# Patient Record
Sex: FEMALE | Race: WHITE | NOT HISPANIC OR LATINO | URBAN - METROPOLITAN AREA
[De-identification: names, ages, dates, MRNs, and addresses within clinical notes are randomized per-mention and may not be internally consistent; named-entity substitution may affect disease eponyms.]

---

## 2019-01-23 ENCOUNTER — TELEPHONE (OUTPATIENT)
Dept: RHEUMATOLOGY | Facility: CLINIC | Age: 69
End: 2019-01-23

## 2019-01-23 NOTE — TELEPHONE ENCOUNTER
----- Message from Javon Sanchez sent at 1/23/2019  2:22 PM CST -----  Good afternoon,     Marlin Naranjo NP would like to refer the following patient to the rheumatology  department. The patients diagnosis is kidney problems. May you please place the patient on Dr. Cantu's wait list? I have scanned the patients referral and records into [facility].     If there are any further questions in regards to the patient, please contact Dr. Naranjo's office at, [phone]  .   Please let me know if I can help schedule in any way.    Thank you,     Javon Sanchez   [facility]     Contacted nephrology office . Referring for positive MICHAEL.

## 2019-06-24 ENCOUNTER — TELEPHONE (OUTPATIENT)
Dept: RHEUMATOLOGY | Facility: CLINIC | Age: 69
End: 2019-06-24

## 2019-06-24 NOTE — TELEPHONE ENCOUNTER
----- Message from Haritha Desmond sent at 6/24/2019  8:46 AM CDT -----  Contact: Patient  Type:  Sooner Apoointment Request    Caller is requesting a sooner appointment.  Caller declined first available appointment listed below.  Caller will not accept being placed on the waitlist and is requesting a message be sent to doctor.    Name of Caller:  Patient  When is the first available appointment?  7/1/19  Symptoms:  Being referred by a Kidney Doctor/ arthritis  Best Call Back Number:    Additional Information:  Calling to reschedule her appt on 7/1/19, she can't make it. She wants the first avail.Please advise

## 2020-01-20 ENCOUNTER — LAB VISIT (OUTPATIENT)
Dept: LAB | Facility: HOSPITAL | Age: 70
End: 2020-01-20
Attending: INTERNAL MEDICINE
Payer: MEDICARE

## 2020-01-20 ENCOUNTER — INITIAL CONSULT (OUTPATIENT)
Dept: RHEUMATOLOGY | Facility: CLINIC | Age: 70
End: 2020-01-20
Payer: MEDICARE

## 2020-01-20 VITALS
WEIGHT: 182 LBS | BODY MASS INDEX: 34.36 KG/M2 | DIASTOLIC BLOOD PRESSURE: 74 MMHG | SYSTOLIC BLOOD PRESSURE: 153 MMHG | HEIGHT: 61 IN | HEART RATE: 60 BPM

## 2020-01-20 DIAGNOSIS — L40.8 PSORIASIS WITH PUSTULES: ICD-10-CM

## 2020-01-20 DIAGNOSIS — E03.9 HYPOTHYROIDISM, UNSPECIFIED TYPE: ICD-10-CM

## 2020-01-20 DIAGNOSIS — L40.8 PSORIASIS WITH PUSTULES: Primary | ICD-10-CM

## 2020-01-20 DIAGNOSIS — M15.9 OSTEOARTHRITIS OF MULTIPLE JOINTS, UNSPECIFIED OSTEOARTHRITIS TYPE: ICD-10-CM

## 2020-01-20 DIAGNOSIS — L40.50 PSA (PSORIATIC ARTHRITIS): ICD-10-CM

## 2020-01-20 DIAGNOSIS — R79.9 ABNORMAL FINDING OF BLOOD CHEMISTRY, UNSPECIFIED: ICD-10-CM

## 2020-01-20 LAB
ALBUMIN SERPL BCP-MCNC: 4.1 G/DL (ref 3.5–5.2)
ALP SERPL-CCNC: 122 U/L (ref 55–135)
ALT SERPL W/O P-5'-P-CCNC: 16 U/L (ref 10–44)
ANION GAP SERPL CALC-SCNC: 9 MMOL/L (ref 8–16)
AST SERPL-CCNC: 23 U/L (ref 10–40)
BASOPHILS # BLD AUTO: 0.05 K/UL (ref 0–0.2)
BASOPHILS NFR BLD: 0.5 % (ref 0–1.9)
BILIRUB SERPL-MCNC: 0.3 MG/DL (ref 0.1–1)
BUN SERPL-MCNC: 15 MG/DL (ref 8–23)
CALCIUM SERPL-MCNC: 9.4 MG/DL (ref 8.7–10.5)
CCP AB SER IA-ACNC: <0.5 U/ML
CHLORIDE SERPL-SCNC: 107 MMOL/L (ref 95–110)
CO2 SERPL-SCNC: 26 MMOL/L (ref 23–29)
CREAT SERPL-MCNC: 1.1 MG/DL (ref 0.5–1.4)
CRP SERPL-MCNC: 2.6 MG/L (ref 0–8.2)
DIFFERENTIAL METHOD: ABNORMAL
EOSINOPHIL # BLD AUTO: 0.4 K/UL (ref 0–0.5)
EOSINOPHIL NFR BLD: 3.8 % (ref 0–8)
ERYTHROCYTE [DISTWIDTH] IN BLOOD BY AUTOMATED COUNT: 13.2 % (ref 11.5–14.5)
ERYTHROCYTE [SEDIMENTATION RATE] IN BLOOD BY WESTERGREN METHOD: 7 MM/HR (ref 0–20)
EST. GFR  (AFRICAN AMERICAN): 59.2 ML/MIN/1.73 M^2
EST. GFR  (NON AFRICAN AMERICAN): 51.3 ML/MIN/1.73 M^2
FERRITIN SERPL-MCNC: 23 NG/ML (ref 20–300)
GLUCOSE SERPL-MCNC: 82 MG/DL (ref 70–110)
HCT VFR BLD AUTO: 43.3 % (ref 37–48.5)
HGB BLD-MCNC: 13.3 G/DL (ref 12–16)
IMM GRANULOCYTES # BLD AUTO: 0.03 K/UL (ref 0–0.04)
IMM GRANULOCYTES NFR BLD AUTO: 0.3 % (ref 0–0.5)
IRON SERPL-MCNC: 53 UG/DL (ref 30–160)
LYMPHOCYTES # BLD AUTO: 1.9 K/UL (ref 1–4.8)
LYMPHOCYTES NFR BLD: 20.3 % (ref 18–48)
MCH RBC QN AUTO: 26.9 PG (ref 27–31)
MCHC RBC AUTO-ENTMCNC: 30.7 G/DL (ref 32–36)
MCV RBC AUTO: 88 FL (ref 82–98)
MONOCYTES # BLD AUTO: 0.9 K/UL (ref 0.3–1)
MONOCYTES NFR BLD: 9.8 % (ref 4–15)
NEUTROPHILS # BLD AUTO: 6.3 K/UL (ref 1.8–7.7)
NEUTROPHILS NFR BLD: 65.3 % (ref 38–73)
NRBC BLD-RTO: 0 /100 WBC
PLATELET # BLD AUTO: 282 K/UL (ref 150–350)
PMV BLD AUTO: 11.3 FL (ref 9.2–12.9)
POTASSIUM SERPL-SCNC: 4.2 MMOL/L (ref 3.5–5.1)
PROT SERPL-MCNC: 7.6 G/DL (ref 6–8.4)
RBC # BLD AUTO: 4.95 M/UL (ref 4–5.4)
RHEUMATOID FACT SERPL-ACNC: <10 IU/ML (ref 0–15)
SATURATED IRON: 14 % (ref 20–50)
SODIUM SERPL-SCNC: 142 MMOL/L (ref 136–145)
T3FREE SERPL-MCNC: 2.2 PG/ML (ref 2.3–4.2)
T4 FREE SERPL-MCNC: 0.96 NG/DL (ref 0.71–1.51)
THYROGLOB AB SERPL IA-ACNC: <4 IU/ML (ref 0–3.9)
THYROPEROXIDASE IGG SERPL-ACNC: <6 IU/ML
TOTAL IRON BINDING CAPACITY: 376 UG/DL (ref 250–450)
TRANSFERRIN SERPL-MCNC: 252 MG/DL (ref 200–375)
TRANSFERRIN SERPL-MCNC: 254 MG/DL (ref 200–375)
TSH SERPL DL<=0.005 MIU/L-ACNC: 3.35 UIU/ML (ref 0.4–4)
WBC # BLD AUTO: 9.58 K/UL (ref 3.9–12.7)

## 2020-01-20 PROCEDURE — 86235 NUCLEAR ANTIGEN ANTIBODY: CPT | Mod: 59

## 2020-01-20 PROCEDURE — 84165 PROTEIN E-PHORESIS SERUM: CPT

## 2020-01-20 PROCEDURE — 87340 HEPATITIS B SURFACE AG IA: CPT

## 2020-01-20 PROCEDURE — 84165 PATHOLOGIST INTERPRETATION SPE: ICD-10-PCS | Mod: 26,,, | Performed by: PATHOLOGY

## 2020-01-20 PROCEDURE — 99213 OFFICE O/P EST LOW 20 MIN: CPT | Mod: PBBFAC,PO,25 | Performed by: INTERNAL MEDICINE

## 2020-01-20 PROCEDURE — 86800 THYROGLOBULIN ANTIBODY: CPT

## 2020-01-20 PROCEDURE — 86200 CCP ANTIBODY: CPT

## 2020-01-20 PROCEDURE — 83540 ASSAY OF IRON: CPT

## 2020-01-20 PROCEDURE — 86706 HEP B SURFACE ANTIBODY: CPT

## 2020-01-20 PROCEDURE — 85025 COMPLETE CBC W/AUTO DIFF WBC: CPT

## 2020-01-20 PROCEDURE — 86038 ANTINUCLEAR ANTIBODIES: CPT

## 2020-01-20 PROCEDURE — 36415 COLL VENOUS BLD VENIPUNCTURE: CPT | Mod: PO

## 2020-01-20 PROCEDURE — 99205 PR OFFICE/OUTPT VISIT, NEW, LEVL V, 60-74 MIN: ICD-10-PCS | Mod: 25,S$PBB,, | Performed by: NURSE PRACTITIONER

## 2020-01-20 PROCEDURE — 85651 RBC SED RATE NONAUTOMATED: CPT | Mod: PO

## 2020-01-20 PROCEDURE — 84443 ASSAY THYROID STIM HORMONE: CPT

## 2020-01-20 PROCEDURE — 84481 FREE ASSAY (FT-3): CPT

## 2020-01-20 PROCEDURE — 86235 NUCLEAR ANTIGEN ANTIBODY: CPT

## 2020-01-20 PROCEDURE — 84165 PROTEIN E-PHORESIS SERUM: CPT | Mod: 26,,, | Performed by: PATHOLOGY

## 2020-01-20 PROCEDURE — 99205 OFFICE O/P NEW HI 60 MIN: CPT | Mod: 25,S$PBB,, | Performed by: NURSE PRACTITIONER

## 2020-01-20 PROCEDURE — 96372 THER/PROPH/DIAG INJ SC/IM: CPT | Mod: PBBFAC,PO

## 2020-01-20 PROCEDURE — 80053 COMPREHEN METABOLIC PANEL: CPT

## 2020-01-20 PROCEDURE — 86140 C-REACTIVE PROTEIN: CPT

## 2020-01-20 PROCEDURE — 84466 ASSAY OF TRANSFERRIN: CPT | Mod: 91

## 2020-01-20 PROCEDURE — 86225 DNA ANTIBODY NATIVE: CPT

## 2020-01-20 PROCEDURE — 99999 PR PBB SHADOW E&M-EST. PATIENT-LVL III: CPT | Mod: PBBFAC,,, | Performed by: INTERNAL MEDICINE

## 2020-01-20 PROCEDURE — 99999 PR PBB SHADOW E&M-EST. PATIENT-LVL III: ICD-10-PCS | Mod: PBBFAC,,, | Performed by: INTERNAL MEDICINE

## 2020-01-20 PROCEDURE — 86705 HEP B CORE ANTIBODY IGM: CPT

## 2020-01-20 PROCEDURE — 83516 IMMUNOASSAY NONANTIBODY: CPT

## 2020-01-20 PROCEDURE — 82728 ASSAY OF FERRITIN: CPT

## 2020-01-20 PROCEDURE — 86803 HEPATITIS C AB TEST: CPT

## 2020-01-20 PROCEDURE — 84439 ASSAY OF FREE THYROXINE: CPT

## 2020-01-20 PROCEDURE — 86431 RHEUMATOID FACTOR QUANT: CPT

## 2020-01-20 RX ORDER — CYANOCOBALAMIN 1000 UG/ML
1000 INJECTION, SOLUTION INTRAMUSCULAR; SUBCUTANEOUS
Status: COMPLETED | OUTPATIENT
Start: 2020-01-20 | End: 2020-01-20

## 2020-01-20 RX ORDER — ERGOCALCIFEROL 1.25 MG/1
50000 CAPSULE ORAL
COMMUNITY
End: 2020-12-08

## 2020-01-20 RX ORDER — TERBINAFINE HYDROCHLORIDE 250 MG/1
250 TABLET ORAL DAILY
Qty: 15 TABLET | Refills: 0 | Status: SHIPPED | OUTPATIENT
Start: 2020-01-20 | End: 2020-02-04

## 2020-01-20 RX ADMIN — CYANOCOBALAMIN 1000 MCG: 1000 INJECTION, SOLUTION INTRAMUSCULAR at 11:01

## 2020-01-20 ASSESSMENT — ROUTINE ASSESSMENT OF PATIENT INDEX DATA (RAPID3)
PATIENT GLOBAL ASSESSMENT SCORE: 1
MDHAQ FUNCTION SCORE: .2
PSYCHOLOGICAL DISTRESS SCORE: 1.1
PAIN SCORE: 1
TOTAL RAPID3 SCORE: .89

## 2020-01-20 NOTE — PROGRESS NOTES
Administered 1 cc ( 1000 mcg/ml ) of b12 to the left upper outer arm. Informed of s/s to report verbalized understanding. No adverse reactions noted.    Lot # [id number]  Expiration jan 21

## 2020-01-20 NOTE — PROGRESS NOTES
Subjective:       Patient ID: Letha Enciso is a 70 y.o. female.    Chief Complaint: Positive MICHAEL; Pain; and Rash    HPI:  Pt is a 71 yo female with a h/o rash on her skin on the palms of her hands and arch of her feet and  R ankle her joint hands and feet and back is painful. And has anemia unknown cause tx with iron infusions. Patient complains of arthralgias and myalgias for which has been present for a few years. Pain is located in multiple joints, both shoulder(s), both elbow(s), both wrist(s), both MCP(s): 1st, 2nd, 3rd, 4th and 5th, both PIP(s): 1st, 2nd, 3rd, 4th and 5th, both DIP(s): 1st and 2nd, both hip(s), both knee(s) and both MTP(s): 1st, 2nd, 3rd, 4th and 5th, is described as aching, pulsating, shooting and throbbing, and is constant, moderate .  Associated symptoms include: crepitation, decreased range of motion, edema, effusion, tenderness and warmth. Wears  Gloves but is not sure if she is allergic to latex, grand father had rashes. No ra and sle. In the past though she had sle 20 yrs ago, she tried cream no relief.    Review of Systems   Constitutional: Negative for activity change, appetite change, chills, diaphoresis, fatigue, fever and unexpected weight change.   HENT: Negative for congestion, dental problem, ear discharge, ear pain, facial swelling, mouth sores, nosebleeds, postnasal drip, rhinorrhea, sinus pressure, sneezing, sore throat, tinnitus, trouble swallowing and voice change.    Eyes: Negative for photophobia, pain, discharge, redness and itching.   Respiratory: Negative for apnea, cough, chest tightness, shortness of breath and wheezing.    Cardiovascular: Positive for leg swelling. Negative for chest pain and palpitations.   Gastrointestinal: Negative for abdominal distention, abdominal pain, constipation, diarrhea, nausea and vomiting.   Endocrine: Negative for cold intolerance, heat intolerance, polydipsia and polyuria.   Genitourinary: Negative for decreased urine volume,  "difficulty urinating, dysuria, flank pain, frequency, hematuria and urgency.   Musculoskeletal: Positive for arthralgias, back pain, gait problem, joint swelling, myalgias, neck pain and neck stiffness.   Skin: Positive for rash. Negative for pallor and wound.   Allergic/Immunologic: Negative for immunocompromised state.   Neurological: Negative for dizziness, tremors, weakness, numbness and headaches.   Hematological: Negative for adenopathy. Does not bruise/bleed easily.   Psychiatric/Behavioral: Negative for sleep disturbance. The patient is not nervous/anxious.          Objective:   BP (!) 153/74 (BP Location: Left arm, Patient Position: Sitting, BP Method: Medium (Automatic))   Pulse 60   Ht 5' 1" (1.549 m)   Wt 82.6 kg (182 lb)   BMI 34.39 kg/m²      Physical Exam       Right Side Rheumatological Exam     Examination finds the shoulder and elbow normal.    The patient is tender to palpation of the wrist and knee    She has swelling of the wrist and 1st PIP    The patient has an enlarged wrist, knee, 1st PIP, 1st MCP, 2nd PIP, 2nd MCP, 3rd PIP, 3rd MCP, 4th PIP, 4th MCP, 5th PIP and 5th MCP    Foot Exam     Range of Motion   First MTP Joint: limited  Ankle Swelling: positive  Ankle Crepitus: positive    Left Side Rheumatological Exam     Examination finds the shoulder and elbow normal.    She has swelling of the wrist    The patient has an enlarged wrist, knee, 1st PIP, 1st MCP, 2nd PIP, 2nd MCP, 3rd PIP, 3rd MCP, 4th PIP, 4th MCP, 5th PIP and 5th MCP.    Foot Exam     Ankle Warmth: negative  Ankle Swelling: negative      Skin: Rash noted.     Ankle R and palm of the   Musculoskeletal: She exhibits deformity.          Labs 10/19 1/19 anemia low iron elevated kappa levels             Assessment:       1. Psoriasis with pustules    2. Hypothyroidism, unspecified type    3. Osteoarthritis of multiple joints, unspecified osteoarthritis type    4. PSA (psoriatic arthritis)    5. Abnormal finding of blood " chemistry, unspecified             Plan:       Letha was seen today for positive latasha, pain and rash.    Diagnoses and all orders for this visit:    Psoriasis with pustules  Comments:  clinical   Orders:  -     LATASHA Screen w/Reflex; Future  -     Anti Sm/RNP Antibody; Future  -     Anti-DNA antibody, double-stranded; Future  -     Anti-Histone Antibody; Future  -     Anti-scleroderma antibody; Future  -     Anti-Smith antibody; Future  -     CBC auto differential; Future  -     Comprehensive metabolic panel; Future  -     C-reactive protein; Future  -     Sedimentation rate; Future  -     Rheumatoid factor; Future  -     Cyclic citrul peptide antibody, IgG; Future  -     Sjogrens syndrome-A extractable nuclear antibody; Future  -     Sjogrens syndrome-B extractable nuclear antibody; Future  -     TSH; Future  -     Thyroid peroxidase antibody; Future  -     T4, free; Future  -     T3, free; Future  -     Anti-thyroglobulin antibody; Future  -     Hepatitis B core antibody, IgM; Future  -     Hepatitis B surface antibody; Future  -     Hepatitis B surface antigen; Future  -     Hepatitis C antibody; Future  -     Quantiferon Gold TB; Future  -     Iron and TIBC; Future  -     Protein electrophoresis, serum; Future  -     HLA CELIAC CL2; Future  -     HLA B27 Antigen; Future  -     Ambulatory consult to Dermatology  -     Ferritin; Future  -     Transferrin; Future  -     terbinafine HCl (LAMISIL) 250 mg tablet; Take 1 tablet (250 mg total) by mouth once daily. for 15 days  -     cyanocobalamin injection 1,000 mcg    Hypothyroidism, unspecified type  -     LATASHA Screen w/Reflex; Future  -     Anti Sm/RNP Antibody; Future  -     Anti-DNA antibody, double-stranded; Future  -     Anti-Histone Antibody; Future  -     Anti-scleroderma antibody; Future  -     Anti-Smith antibody; Future  -     CBC auto differential; Future  -     Comprehensive metabolic panel; Future  -     C-reactive protein; Future  -     Sedimentation  rate; Future  -     Rheumatoid factor; Future  -     Cyclic citrul peptide antibody, IgG; Future  -     Sjogrens syndrome-A extractable nuclear antibody; Future  -     Sjogrens syndrome-B extractable nuclear antibody; Future  -     TSH; Future  -     Thyroid peroxidase antibody; Future  -     T4, free; Future  -     T3, free; Future  -     Anti-thyroglobulin antibody; Future  -     Hepatitis B core antibody, IgM; Future  -     Hepatitis B surface antibody; Future  -     Hepatitis B surface antigen; Future  -     Hepatitis C antibody; Future  -     Quantiferon Gold TB; Future  -     Iron and TIBC; Future  -     Protein electrophoresis, serum; Future  -     HLA CELIAC CL2; Future  -     HLA B27 Antigen; Future  -     Ambulatory consult to Dermatology  -     Ferritin; Future  -     Transferrin; Future  -     terbinafine HCl (LAMISIL) 250 mg tablet; Take 1 tablet (250 mg total) by mouth once daily. for 15 days  -     cyanocobalamin injection 1,000 mcg    Osteoarthritis of multiple joints, unspecified osteoarthritis type  -     MICHAEL Screen w/Reflex; Future  -     Anti Sm/RNP Antibody; Future  -     Anti-DNA antibody, double-stranded; Future  -     Anti-Histone Antibody; Future  -     Anti-scleroderma antibody; Future  -     Anti-Smith antibody; Future  -     CBC auto differential; Future  -     Comprehensive metabolic panel; Future  -     C-reactive protein; Future  -     Sedimentation rate; Future  -     Rheumatoid factor; Future  -     Cyclic citrul peptide antibody, IgG; Future  -     Sjogrens syndrome-A extractable nuclear antibody; Future  -     Sjogrens syndrome-B extractable nuclear antibody; Future  -     TSH; Future  -     Thyroid peroxidase antibody; Future  -     T4, free; Future  -     T3, free; Future  -     Anti-thyroglobulin antibody; Future  -     Hepatitis B core antibody, IgM; Future  -     Hepatitis B surface antibody; Future  -     Hepatitis B surface antigen; Future  -     Hepatitis C antibody;  Future  -     Quantiferon Gold TB; Future  -     Iron and TIBC; Future  -     Protein electrophoresis, serum; Future  -     HLA CELIAC CL2; Future  -     HLA B27 Antigen; Future  -     Ambulatory consult to Dermatology  -     Ferritin; Future  -     Transferrin; Future  -     terbinafine HCl (LAMISIL) 250 mg tablet; Take 1 tablet (250 mg total) by mouth once daily. for 15 days  -     cyanocobalamin injection 1,000 mcg    PSA (psoriatic arthritis)  -     MICHAEL Screen w/Reflex; Future  -     Anti Sm/RNP Antibody; Future  -     Anti-DNA antibody, double-stranded; Future  -     Anti-Histone Antibody; Future  -     Anti-scleroderma antibody; Future  -     Anti-Smith antibody; Future  -     CBC auto differential; Future  -     Comprehensive metabolic panel; Future  -     C-reactive protein; Future  -     Sedimentation rate; Future  -     Rheumatoid factor; Future  -     Cyclic citrul peptide antibody, IgG; Future  -     Sjogrens syndrome-A extractable nuclear antibody; Future  -     Sjogrens syndrome-B extractable nuclear antibody; Future  -     TSH; Future  -     Thyroid peroxidase antibody; Future  -     T4, free; Future  -     T3, free; Future  -     Anti-thyroglobulin antibody; Future  -     Hepatitis B core antibody, IgM; Future  -     Hepatitis B surface antibody; Future  -     Hepatitis B surface antigen; Future  -     Hepatitis C antibody; Future  -     Quantiferon Gold TB; Future  -     Iron and TIBC; Future  -     Protein electrophoresis, serum; Future  -     HLA CELIAC CL2; Future  -     HLA B27 Antigen; Future  -     Ambulatory consult to Dermatology  -     Ferritin; Future  -     Transferrin; Future  -     terbinafine HCl (LAMISIL) 250 mg tablet; Take 1 tablet (250 mg total) by mouth once daily. for 15 days  -     cyanocobalamin injection 1,000 mcg    Abnormal finding of blood chemistry, unspecified   -     MICHAEL Screen w/Reflex; Future  -     Anti Sm/RNP Antibody; Future  -     Anti-DNA antibody,  double-stranded; Future  -     Anti-Histone Antibody; Future  -     Anti-scleroderma antibody; Future  -     Anti-Smith antibody; Future  -     CBC auto differential; Future  -     Comprehensive metabolic panel; Future  -     C-reactive protein; Future  -     Sedimentation rate; Future  -     Rheumatoid factor; Future  -     Cyclic citrul peptide antibody, IgG; Future  -     Sjogrens syndrome-A extractable nuclear antibody; Future  -     Sjogrens syndrome-B extractable nuclear antibody; Future  -     TSH; Future  -     Thyroid peroxidase antibody; Future  -     T4, free; Future  -     T3, free; Future  -     Anti-thyroglobulin antibody; Future  -     Hepatitis B core antibody, IgM; Future  -     Hepatitis B surface antibody; Future  -     Hepatitis B surface antigen; Future  -     Hepatitis C antibody; Future  -     Quantiferon Gold TB; Future  -     Iron and TIBC; Future  -     Protein electrophoresis, serum; Future  -     HLA CELIAC CL2; Future  -     HLA B27 Antigen; Future  -     Ambulatory consult to Dermatology  -     Ferritin; Future  -     Transferrin; Future  -     terbinafine HCl (LAMISIL) 250 mg tablet; Take 1 tablet (250 mg total) by mouth once daily. for 15 days  -     cyanocobalamin injection 1,000 mcg      More than 50% of the 60 minute encounter was spent face to face counseling the patient regarding current status and future plan of care as well as side of the medications. All questions were answered to patient's satisfaction  She was given info on PSA no new tx started a 14 day lamisil was given

## 2020-01-20 NOTE — LETTER
January 29, 2020      Marlin Naranjo, SANTOS  [address] [facility] [address]  [facility] [place] [address]           [place] - Rheumatology  [address] [place] [address]  Phone: [phone]  Fax: [phone]          Patient: Letha Enciso   MR Number: [id number]   YOB: 1950   Date of Visit: 1/20/2020       Dear Marlin Naranjo:    Thank you for referring Letha Enciso to me for evaluation. Attached you will find relevant portions of my assessment and plan of care.    If you have questions, please do not hesitate to call me. I look forward to following Letha Enciso along with you.    Sincerely,    Ethan Cantu MD    Enclosure  CC:  No Recipients    If you would like to receive this communication electronically, please contact [email or web address] or [phone] to request more information on [facility] Link access.    For providers and/or their staff who would like to refer a patient to [facility], please contact us through our one-stop-shop provider referral line, [facility], at [phone].    If you feel you have received this communication in error or would no longer like to receive these types of communications, please e-mail [email or web address]

## 2020-01-21 LAB
ALBUMIN SERPL ELPH-MCNC: 4.35 G/DL (ref 3.35–5.55)
ALPHA1 GLOB SERPL ELPH-MCNC: 0.26 G/DL (ref 0.17–0.41)
ALPHA2 GLOB SERPL ELPH-MCNC: 0.68 G/DL (ref 0.43–0.99)
ANA SER QL IF: NORMAL
ANTI SM ANTIBODY: 2.87 EU (ref 0–19.99)
ANTI SM/RNP ANTIBODY: 2.78 EU (ref 0–19.99)
ANTI-SM INTERPRETATION: NEGATIVE
ANTI-SM/RNP INTERPRETATION: NEGATIVE
ANTI-SSA ANTIBODY: 0.81 EU (ref 0–19.99)
ANTI-SSA INTERPRETATION: NEGATIVE
ANTI-SSB ANTIBODY: 0.99 EU (ref 0–19.99)
ANTI-SSB INTERPRETATION: NEGATIVE
B-GLOBULIN SERPL ELPH-MCNC: 0.8 G/DL (ref 0.5–1.1)
DSDNA AB SER-ACNC: NORMAL [IU]/ML
GAMMA GLOB SERPL ELPH-MCNC: 1.02 G/DL (ref 0.67–1.58)
HBV CORE IGM SERPL QL IA: NEGATIVE
HBV SURFACE AB SER-ACNC: NEGATIVE M[IU]/ML
HBV SURFACE AG SERPL QL IA: NEGATIVE
HCV AB SERPL QL IA: NEGATIVE
PATHOLOGIST INTERPRETATION SPE: NORMAL
PROT SERPL-MCNC: 7.1 G/DL (ref 6–8.4)

## 2020-01-22 LAB — ENA SCL70 AB SER-ACNC: 7 UNITS

## 2020-01-23 LAB
HISTONE IGG SER IA-ACNC: 2.2 UNITS (ref 0–0.9)
HLA-B27 RELATED AG QL: NEGATIVE
HLA-B27 RELATED AG QL: NORMAL

## 2020-01-23 PROCEDURE — 81374 HLA I TYPING 1 ANTIGEN LR: CPT | Mod: PO

## 2020-01-23 PROCEDURE — 81373 HLA I TYPING 1 LOCUS LR: CPT | Mod: PO

## 2020-01-23 PROCEDURE — 81375 HLA II TYPING AG EQUIV LR: CPT | Mod: PO

## 2020-01-24 LAB
CELIA INTERPRETATION: NORMAL
CELIA TESTING DATE: NORMAL
HLA DQA1 1: 3
HLA DQA1 2: NORMAL
HLA DRB4 1: NORMAL
HLA-DP 1 SERO. EQUIV: NORMAL
HLA-DP 2 SERO. EQUIV: NORMAL
HLA-DPA1 1: NORMAL
HLA-DPA1 2: NORMAL
HLA-DPB1 1: NORMAL
HLA-DPB1 2: NORMAL
HLA-DQ 1 SERO. EQUIV: 8
HLA-DQ 2 SERO. EQUIV: NORMAL
HLA-DQB1 1: NORMAL
HLA-DQB1 2: NORMAL
HLA-DRB1 1 SERO. EQUIV: NORMAL
HLA-DRB1 1: NORMAL
HLA-DRB1 2 SERO. EQUIV: NORMAL
HLA-DRB1 2: NORMAL
HLA-DRB3 1: NORMAL
HLA-DRB3 2: NORMAL
HLA-DRB345 1 SERO. EQUIV: NORMAL
HLA-DRB345 2 SERO. EQUIV: NORMAL
HLA-DRB4 2: NORMAL
HLA-DRB5 1: NORMAL
HLA-DRB5 2: NORMAL

## 2020-03-19 ENCOUNTER — TELEPHONE (OUTPATIENT)
Dept: RHEUMATOLOGY | Facility: CLINIC | Age: 70
End: 2020-03-19

## 2020-03-19 NOTE — TELEPHONE ENCOUNTER
----- Message from Sima  sent at 3/19/2020  9:26 AM CDT -----  Contact: pt  Type: Needs Medical Advice    Who Called:      Best Call Back Number:   Additional Information: Requesting a call back from Nurse, regarding pt wants to know if she is to still come to appt tomorrow she received a call,please call back with advice

## 2020-03-19 NOTE — TELEPHONE ENCOUNTER
----- Message from Kimi Cochran sent at 3/19/2020  3:34 PM CDT -----  Contact: patient  Type:  Patient Returning Call    Who Called:  patient  Who Left Message for Patient:  nancy  Does the patient know what this is regarding?:  Unsure  Best Call Back Number:     Additional Information:  Please advise-thank you

## 2020-03-20 ENCOUNTER — OFFICE VISIT (OUTPATIENT)
Dept: RHEUMATOLOGY | Facility: CLINIC | Age: 70
End: 2020-03-20
Payer: MEDICARE

## 2020-03-20 VITALS
HEART RATE: 59 BPM | HEIGHT: 61 IN | DIASTOLIC BLOOD PRESSURE: 84 MMHG | BODY MASS INDEX: 34.93 KG/M2 | SYSTOLIC BLOOD PRESSURE: 173 MMHG | WEIGHT: 185 LBS

## 2020-03-20 DIAGNOSIS — L13.0 DERMATITIS HERPETIFORMIS: ICD-10-CM

## 2020-03-20 DIAGNOSIS — K90.41 NCGS (NON-CELIAC GLUTEN SENSITIVITY): ICD-10-CM

## 2020-03-20 DIAGNOSIS — L40.8 PSORIASIS WITH PUSTULES: Primary | ICD-10-CM

## 2020-03-20 DIAGNOSIS — R79.9 ABNORMAL FINDING OF BLOOD CHEMISTRY, UNSPECIFIED: ICD-10-CM

## 2020-03-20 DIAGNOSIS — L40.50 PSA (PSORIATIC ARTHRITIS): ICD-10-CM

## 2020-03-20 PROCEDURE — 99999 PR PBB SHADOW E&M-EST. PATIENT-LVL III: CPT | Mod: PBBFAC,,, | Performed by: INTERNAL MEDICINE

## 2020-03-20 PROCEDURE — 99213 OFFICE O/P EST LOW 20 MIN: CPT | Mod: PBBFAC,PO | Performed by: INTERNAL MEDICINE

## 2020-03-20 PROCEDURE — 99214 OFFICE O/P EST MOD 30 MIN: CPT | Mod: S$PBB,,, | Performed by: INTERNAL MEDICINE

## 2020-03-20 PROCEDURE — 99999 PR PBB SHADOW E&M-EST. PATIENT-LVL III: ICD-10-PCS | Mod: PBBFAC,,, | Performed by: INTERNAL MEDICINE

## 2020-03-20 PROCEDURE — 99214 PR OFFICE/OUTPT VISIT, EST, LEVL IV, 30-39 MIN: ICD-10-PCS | Mod: S$PBB,,, | Performed by: INTERNAL MEDICINE

## 2020-03-20 RX ORDER — TRIAMCINOLONE ACETONIDE 0.25 MG/G
OINTMENT TOPICAL 2 TIMES DAILY
Qty: 80 G | Refills: 6 | Status: SHIPPED | OUTPATIENT
Start: 2020-03-20 | End: 2020-04-19

## 2020-03-20 ASSESSMENT — ROUTINE ASSESSMENT OF PATIENT INDEX DATA (RAPID3)
PSYCHOLOGICAL DISTRESS SCORE: 0
PATIENT GLOBAL ASSESSMENT SCORE: 3
MDHAQ FUNCTION SCORE: .2
PAIN SCORE: 0
FATIGUE SCORE: 0
TOTAL RAPID3 SCORE: 1.22

## 2020-03-20 NOTE — PROGRESS NOTES
Subjective:       Patient ID: Letha Enciso is a 70 y.o. female.    Chief Complaint: Disease Management; Psoriasis; Pain; and Swelling    Follow up: 71 yo female dx PSA and  Newly dx Non celiac gluten sensitivity DR8 homozygot,  Pos drug  Induce sle marker. Pain is located in multiple joints, both shoulder(s), both elbow(s), both wrist(s), both MCP(s): 1st, 2nd, 3rd, 4th and 5th, both PIP(s): 1st, 2nd, 3rd, 4th and 5th, both DIP(s): 1st and 2nd, both hip(s), both knee(s) and both MTP(s): 1st, 2nd, 3rd, 4th and 5th, is described as aching, pulsating, shooting and throbbing, and is constant, moderate .  Associated symptoms include: crepitation, decreased range of motion, edema, effusion, tenderness and warmth.    Review of Systems   Constitutional: Negative for activity change, appetite change and unexpected weight change.   HENT: Negative for dental problem, ear discharge, ear pain, facial swelling, mouth sores, nosebleeds, postnasal drip, rhinorrhea, sinus pressure, sneezing, tinnitus and voice change.    Eyes: Negative for photophobia, pain, discharge, redness and itching.   Respiratory: Negative for apnea, chest tightness, shortness of breath and wheezing.    Cardiovascular: Positive for leg swelling. Negative for palpitations.   Gastrointestinal: Negative for abdominal distention, constipation and diarrhea.   Endocrine: Negative for cold intolerance, heat intolerance, polydipsia and polyuria.   Genitourinary: Negative for decreased urine volume, difficulty urinating, flank pain, frequency, hematuria and urgency.   Musculoskeletal: Positive for arthralgias, back pain, gait problem, joint swelling, myalgias, neck pain and neck stiffness.   Skin: Negative for pallor and wound.   Allergic/Immunologic: Negative for immunocompromised state.   Neurological: Negative for dizziness and tremors.   Hematological: Negative for adenopathy. Does not bruise/bleed easily.   Psychiatric/Behavioral: Negative for sleep  "disturbance. The patient is not nervous/anxious.          Objective:   BP (!) 173/84 (BP Location: Left arm, Patient Position: Sitting, BP Method: Medium (Automatic))   Pulse (!) 59   Ht 5' 1" (1.549 m)   Wt 83.9 kg (185 lb)   BMI 34.96 kg/m²      Physical Exam       Right Side Rheumatological Exam     Examination finds the shoulder and elbow normal.    The patient is tender to palpation of the wrist and knee    She has swelling of the wrist and 1st PIP    The patient has an enlarged wrist, knee, 1st PIP, 1st MCP, 2nd PIP, 2nd MCP, 3rd PIP, 3rd MCP, 4th PIP, 4th MCP, 5th PIP and 5th MCP    Foot Exam     Range of Motion   First MTP Joint: limited  Ankle Swelling: positive  Ankle Crepitus: positive    Left Side Rheumatological Exam     Examination finds the shoulder and elbow normal.    She has swelling of the wrist    The patient has an enlarged wrist, knee, 1st PIP, 1st MCP, 2nd PIP, 2nd MCP, 3rd PIP, 3rd MCP, 4th PIP, 4th MCP, 5th PIP and 5th MCP.    Foot Exam     Ankle Warmth: negative  Ankle Swelling: negative      Skin: Rash noted.     Ankle R and palm of the   Musculoskeletal: She exhibits tenderness and deformity.          Labs 10/19 1/19 anemia low iron elevated kappa levels             Assessment:       1. Psoriasis with pustules    2. PSA (psoriatic arthritis)    3. Abnormal finding of blood chemistry, unspecified     4. NCGS (non-celiac gluten sensitivity)    5. Dermatitis herpetiformis            Plan:       Letha was seen today for disease management, psoriasis, pain and swelling.    Diagnoses and all orders for this visit:    Psoriasis with pustules  -     ustekinumab (STELARA) 90 mg/mL Syrg syringe; Inject 1 mL (90 mg total) into the skin every 30 days.  -     ustekinumab (STELARA) 90 mg/mL Syrg syringe; Inject 1 mL (90 mg total) into the skin every 3 (three) months.    PSA (psoriatic arthritis)    Abnormal finding of blood chemistry, unspecified     NCGS (non-celiac gluten " sensitivity)    Dermatitis herpetiformis  -     triamcinolone acetonide 0.025% (KENALOG) 0.025 % Oint; Apply topically 2 (two) times daily.       she will try gluten free diet and we will get stelara approved and after  micheal b 19 clears start stelara.over 50% of this visit was explain labs and possible disease states and course of treatments    More than 50% of the 60 minute encounter was spent face to face counseling the patient regarding current status and future plan of care as well as side of the medications. All questions were answered to patient's satisfaction

## 2020-03-24 ENCOUNTER — TELEPHONE (OUTPATIENT)
Dept: PHARMACY | Facility: CLINIC | Age: 70
End: 2020-03-24

## 2020-03-24 NOTE — TELEPHONE ENCOUNTER
LVM for callback to inform patient that [facility] received prescription for Stelara and prior authorization is required.  [facility] will be back in touch once insurance determination is received.

## 2020-04-20 NOTE — TELEPHONE ENCOUNTER
DOCUMENTATION ONLY:   Stelara prior authorization approved until 4/18/23  Estimated Co-Pay: $2636.16  Forwarded to Financial Assistance for co-pay card

## 2020-04-20 NOTE — TELEPHONE ENCOUNTER
Patient reached regarding Stelara prescription. Offered to apply for financial assistance through [employer] but patient is over the income limit for the program. She reports she was told to try a GF diet and has done so for 4 weeks and has shown improvement. Advised pharmacist will reach out to provider to see how she would like patient to proceed.

## 2020-04-22 ENCOUNTER — TELEPHONE (OUTPATIENT)
Dept: RHEUMATOLOGY | Facility: CLINIC | Age: 70
End: 2020-04-22

## 2020-04-22 NOTE — TELEPHONE ENCOUNTER
----- Message from Hayley John, PharmD sent at 4/21/2020  9:07 AM CDT -----  Regarding: Jayesh Cantu and staff,    [facility] has been working on Ms. Enciso's Hugolara. She was approved with very high copay but declined application for  assistance at this time. She says she was advised to try a gluten free diet and has done so over the last month and has seen improvement. I just wanted to make sure you were aware. Please advise if [facility] can be of further assistance.     Thank you,  Hayley John, PharmD  [facility]  [phone]

## 2020-05-25 ENCOUNTER — TELEPHONE (OUTPATIENT)
Dept: RHEUMATOLOGY | Facility: CLINIC | Age: 70
End: 2020-05-25

## 2020-05-25 NOTE — TELEPHONE ENCOUNTER
----- Message from Erin Joyner PharmD sent at 5/12/2020  2:34 PM CDT -----  Regarding: PsO   Good afternoon Dr. Cantu and staff,    Ms. Enciso reports the her gluten free diet has been helping, but she reports that her PsO is still itching significantly, causing sores and keeping her awake at night. Patient does not qualify for financial assistance for Stelara, and medication is too costly for patient.     Her next appt is 6/8. Patient is open to other treatment options. Cosentyx, Otezla, Tremfya, or Taltz are other options to manage the patient's PsO, if appropriate. Please advise, and please let me know if I can assist further. Thank you    Best,    Erin Joyner, PharmD  Clinical Pharmacist  [facility]  [phone]

## 2020-05-26 NOTE — TELEPHONE ENCOUNTER
We can discuss alternative tx at her f/u visit. If she needs to be seen sooner then move her up with me

## 2020-05-27 NOTE — TELEPHONE ENCOUNTER
Incoming call from pt, she reports she does need to be seen in office. Discussed with Whitley and scheduled pt for 6/3 @ 9am to be seen in office. No further questions.

## 2020-06-03 ENCOUNTER — LAB VISIT (OUTPATIENT)
Dept: LAB | Facility: HOSPITAL | Age: 70
End: 2020-06-03
Attending: PHYSICIAN ASSISTANT
Payer: MEDICARE

## 2020-06-03 ENCOUNTER — TELEPHONE (OUTPATIENT)
Dept: PHARMACY | Facility: CLINIC | Age: 70
End: 2020-06-03

## 2020-06-03 ENCOUNTER — OFFICE VISIT (OUTPATIENT)
Dept: RHEUMATOLOGY | Facility: CLINIC | Age: 70
End: 2020-06-03
Payer: MEDICARE

## 2020-06-03 VITALS
DIASTOLIC BLOOD PRESSURE: 83 MMHG | SYSTOLIC BLOOD PRESSURE: 160 MMHG | WEIGHT: 169 LBS | BODY MASS INDEX: 31.91 KG/M2 | HEIGHT: 61 IN

## 2020-06-03 DIAGNOSIS — N28.9 RENAL INSUFFICIENCY: ICD-10-CM

## 2020-06-03 DIAGNOSIS — L40.50 PSA (PSORIATIC ARTHRITIS): Primary | ICD-10-CM

## 2020-06-03 DIAGNOSIS — L40.8 PSORIASIS WITH PUSTULES: ICD-10-CM

## 2020-06-03 DIAGNOSIS — L40.50 PSA (PSORIATIC ARTHRITIS): ICD-10-CM

## 2020-06-03 DIAGNOSIS — K90.41 NCGS (NON-CELIAC GLUTEN SENSITIVITY): ICD-10-CM

## 2020-06-03 LAB
ALBUMIN SERPL BCP-MCNC: 4.2 G/DL (ref 3.5–5.2)
ALP SERPL-CCNC: 107 U/L (ref 55–135)
ALT SERPL W/O P-5'-P-CCNC: 17 U/L (ref 10–44)
ANION GAP SERPL CALC-SCNC: 7 MMOL/L (ref 8–16)
AST SERPL-CCNC: 26 U/L (ref 10–40)
BASOPHILS # BLD AUTO: 0.07 K/UL (ref 0–0.2)
BASOPHILS NFR BLD: 0.9 % (ref 0–1.9)
BILIRUB SERPL-MCNC: 0.4 MG/DL (ref 0.1–1)
BUN SERPL-MCNC: 16 MG/DL (ref 8–23)
CALCIUM SERPL-MCNC: 10 MG/DL (ref 8.7–10.5)
CHLORIDE SERPL-SCNC: 105 MMOL/L (ref 95–110)
CO2 SERPL-SCNC: 28 MMOL/L (ref 23–29)
CREAT SERPL-MCNC: 1.1 MG/DL (ref 0.5–1.4)
CRP SERPL-MCNC: 1.2 MG/L (ref 0–8.2)
DIFFERENTIAL METHOD: ABNORMAL
EOSINOPHIL # BLD AUTO: 0.2 K/UL (ref 0–0.5)
EOSINOPHIL NFR BLD: 2.8 % (ref 0–8)
ERYTHROCYTE [DISTWIDTH] IN BLOOD BY AUTOMATED COUNT: 13.6 % (ref 11.5–14.5)
ERYTHROCYTE [SEDIMENTATION RATE] IN BLOOD BY WESTERGREN METHOD: 11 MM/HR (ref 0–20)
EST. GFR  (AFRICAN AMERICAN): 59.2 ML/MIN/1.73 M^2
EST. GFR  (NON AFRICAN AMERICAN): 51.3 ML/MIN/1.73 M^2
GLUCOSE SERPL-MCNC: 97 MG/DL (ref 70–110)
HCT VFR BLD AUTO: 45.2 % (ref 37–48.5)
HGB BLD-MCNC: 13.8 G/DL (ref 12–16)
IMM GRANULOCYTES # BLD AUTO: 0.03 K/UL (ref 0–0.04)
IMM GRANULOCYTES NFR BLD AUTO: 0.4 % (ref 0–0.5)
LYMPHOCYTES # BLD AUTO: 1.9 K/UL (ref 1–4.8)
LYMPHOCYTES NFR BLD: 23.9 % (ref 18–48)
MCH RBC QN AUTO: 26.8 PG (ref 27–31)
MCHC RBC AUTO-ENTMCNC: 30.5 G/DL (ref 32–36)
MCV RBC AUTO: 88 FL (ref 82–98)
MONOCYTES # BLD AUTO: 0.8 K/UL (ref 0.3–1)
MONOCYTES NFR BLD: 10.3 % (ref 4–15)
NEUTROPHILS # BLD AUTO: 5 K/UL (ref 1.8–7.7)
NEUTROPHILS NFR BLD: 61.7 % (ref 38–73)
NRBC BLD-RTO: 0 /100 WBC
PLATELET # BLD AUTO: 298 K/UL (ref 150–350)
PMV BLD AUTO: 10.9 FL (ref 9.2–12.9)
POTASSIUM SERPL-SCNC: 4.3 MMOL/L (ref 3.5–5.1)
PROT SERPL-MCNC: 7.6 G/DL (ref 6–8.4)
RBC # BLD AUTO: 5.15 M/UL (ref 4–5.4)
SODIUM SERPL-SCNC: 140 MMOL/L (ref 136–145)
WBC # BLD AUTO: 8.12 K/UL (ref 3.9–12.7)

## 2020-06-03 PROCEDURE — 99213 OFFICE O/P EST LOW 20 MIN: CPT | Mod: PBBFAC,PO | Performed by: PHYSICIAN ASSISTANT

## 2020-06-03 PROCEDURE — 36415 COLL VENOUS BLD VENIPUNCTURE: CPT | Mod: PO

## 2020-06-03 PROCEDURE — 99999 PR PBB SHADOW E&M-EST. PATIENT-LVL III: CPT | Mod: PBBFAC,,, | Performed by: PHYSICIAN ASSISTANT

## 2020-06-03 PROCEDURE — 80053 COMPREHEN METABOLIC PANEL: CPT

## 2020-06-03 PROCEDURE — 85651 RBC SED RATE NONAUTOMATED: CPT | Mod: PO

## 2020-06-03 PROCEDURE — 85025 COMPLETE CBC W/AUTO DIFF WBC: CPT

## 2020-06-03 PROCEDURE — 99214 PR OFFICE/OUTPT VISIT, EST, LEVL IV, 30-39 MIN: ICD-10-PCS | Mod: S$PBB,,, | Performed by: PHYSICIAN ASSISTANT

## 2020-06-03 PROCEDURE — 86140 C-REACTIVE PROTEIN: CPT

## 2020-06-03 PROCEDURE — 99214 OFFICE O/P EST MOD 30 MIN: CPT | Mod: S$PBB,,, | Performed by: PHYSICIAN ASSISTANT

## 2020-06-03 PROCEDURE — 99999 PR PBB SHADOW E&M-EST. PATIENT-LVL III: ICD-10-PCS | Mod: PBBFAC,,, | Performed by: PHYSICIAN ASSISTANT

## 2020-06-03 NOTE — PROGRESS NOTES
Subjective:       Patient ID: Letha Enciso is a 70 y.o. female.    Chief Complaint: Disease Management    Mrs. Enciso is a 70 year old female who presents to clinic for follow up on psoriatic arthritis and psoriasis. She is a new patient to me. At her last visit, Stelara was discussed; however, due to high copay it was never started. Pt also started gluten free diet 3 months ago and has lost 16 lbs and noticed a significant improvement in her generalized pruritis and rash. Kenalog cream is helpful as well. She continues to have psoriasis on the palms and R lateral ankle. She denies significant small joint pain or stiffness, but does have occasional pain the R 2nd DIP. She complains of intermittent neck pain and R knee pain. R knee was evaluated by Ortho in April of last year with IACS and she was started on mobic 7.5 mg which is working quite well.     Current tx:  1. mobic 7.5 mg    Review of Systems   Constitutional: Positive for activity change. Negative for appetite change, chills, fatigue and fever.   Eyes: Negative for visual disturbance.   Respiratory: Negative for cough and shortness of breath.    Cardiovascular: Negative for chest pain, palpitations and leg swelling.   Gastrointestinal: Negative for abdominal pain, constipation, diarrhea, nausea and vomiting.   Musculoskeletal: Positive for arthralgias.   Skin: Positive for rash.   Neurological: Negative for dizziness, weakness, light-headedness and headaches.         Objective:     Vitals:    06/03/20 0914   BP: (!) 160/83       Past Medical History:   Diagnosis Date    Anemia      History reviewed. No pertinent surgical history.       Physical Exam   Constitutional: She is well-developed, well-nourished, and in no distress.   Eyes: Right conjunctiva is not injected. Left conjunctiva is not injected. Right eye exhibits normal extraocular motion. Left eye exhibits normal extraocular motion.   Neck: No JVD present. No thyromegaly present.    Cardiovascular: Normal rate.  Exam reveals no decreased pulses.    Pulmonary/Chest: Effort normal.       Right Side Rheumatological Exam     Examination finds the shoulder, elbow, wrist, knee, 1st PIP, 1st MCP, 2nd PIP, 2nd MCP, 3rd PIP, 3rd MCP, 4th PIP, 4th MCP, 5th PIP and 5th MCP normal.    The patient is tender to palpation of the 2nd DIP    The patient has an enlarged 2nd DIP, 3rd DIP, 4th DIP and 5th DIP    Left Side Rheumatological Exam     Examination finds the shoulder, elbow, wrist, knee, 1st PIP, 1st MCP, 2nd PIP, 2nd MCP, 3rd PIP, 3rd MCP, 4th PIP, 4th MCP, 5th PIP and 5th MCP normal.    The patient has an enlarged 2nd DIP, 3rd DIP, 4th DIP and 5th DIP.      Lymphadenopathy:     She has no cervical adenopathy.   Neurological: Gait normal.   Skin: Rash (psorasis on the R palm and R lateral ankle--much improved compared to 3/2020 visit) noted.     Psychiatric: Mood and affect normal.       Recent labs reviewed:  Component      Latest Ref Rng & Units 1/20/2020   WBC      3.90 - 12.70 K/uL 9.58   RBC      4.00 - 5.40 M/uL 4.95   Hemoglobin      12.0 - 16.0 g/dL 13.3   Hematocrit      37.0 - 48.5 % 43.3   MCV      82 - 98 fL 88   MCH      27.0 - 31.0 pg 26.9 (L)   MCHC      32.0 - 36.0 g/dL 30.7 (L)   RDW      11.5 - 14.5 % 13.2   Platelets      150 - 350 K/uL 282   MPV      9.2 - 12.9 fL 11.3   Immature Granulocytes      0.0 - 0.5 % 0.3   Gran # (ANC)      1.8 - 7.7 K/uL 6.3   Immature Grans (Abs)      0.00 - 0.04 K/uL 0.03   Lymph #      1.0 - 4.8 K/uL 1.9   Mono #      0.3 - 1.0 K/uL 0.9   Eos #      0.0 - 0.5 K/uL 0.4   Baso #      0.00 - 0.20 K/uL 0.05   nRBC      0 /100 WBC 0   Gran%      38.0 - 73.0 % 65.3   Lymph%      18.0 - 48.0 % 20.3   Mono%      4.0 - 15.0 % 9.8   Eosinophil%      0.0 - 8.0 % 3.8   Basophil%      0.0 - 1.9 % 0.5   Differential Method       Automated   Sodium      136 - 145 mmol/L 142   Potassium      3.5 - 5.1 mmol/L 4.2   Chloride      95 - 110 mmol/L 107   CO2      23 - 29  mmol/L 26   Glucose      70 - 110 mg/dL 82   BUN, Bld      8 - 23 mg/dL 15   Creatinine      0.5 - 1.4 mg/dL 1.1   Calcium      8.7 - 10.5 mg/dL 9.4   PROTEIN TOTAL      6.0 - 8.4 g/dL 7.6   Albumin      3.5 - 5.2 g/dL 4.1   BILIRUBIN TOTAL      0.1 - 1.0 mg/dL 0.3   Alkaline Phosphatase      55 - 135 U/L 122   AST      10 - 40 U/L 23   ALT      10 - 44 U/L 16   Anion Gap      8 - 16 mmol/L 9   eGFR if African American      >60 mL/min/1.73 m:2 59.2 (A)   eGFR if non African American      >60 mL/min/1.73 m:2 51.3 (A)   Iron      30 - 160 ug/dL 53   Transferrin      200 - 375 mg/dL 254   TIBC      250 - 450 ug/dL 376   Saturated Iron      20 - 50 % 14 (L)   Anti-Histone Antibody      0.0 - 0.9 Units 2.2 (H)     Assessment:       1. PSA (psoriatic arthritis)    2. NCGS (non-celiac gluten sensitivity)    3. Psoriasis with pustules    4. Renal insufficiency            Plan:       PSA (psoriatic arthritis)  -     apremilast (OTEZLA STARTER) 10 mg (4)-20 mg (4)-30 mg (47) DsPk; Follow instructions on package  Dispense: 1 tablet; Refill: 0  -     apremilast (OTEZLA) 30 mg Tab; Take 1 tablet (30 mg total) by mouth 2 (two) times daily.  Dispense: 60 tablet; Refill: 5  -     CBC auto differential; Future; Expected date: 06/03/2020  -     Comprehensive metabolic panel; Future; Expected date: 06/03/2020  -     C-Reactive Protein; Future; Expected date: 06/03/2020  -     Sedimentation rate; Future; Expected date: 06/03/2020    NCGS (non-celiac gluten sensitivity)    Psoriasis with pustules    Renal insufficiency        Assessment:  70 year old female with  Psoriatic arthritis, psoriasis, hx of MICHAEL positive, histone antibody positive  --hx of XANDER s/p infusion  --NCGS  --renal insufficiency    Plan:  1. Check labs today. Monitor renal function closely on mobic.  2. Start Otezla 30 mg bid sent to [facility]. Information provided to patient and discussed r/b/a. Jayesh was too expensive. May consider SSZ if not able to afford.  3. Cont.  mobic 7.5 mg daily  4. Monitor BP at home and keep a log. Notify clinic if BP is persistently >140/90.      Follow up:  3 months Dr. Cantu

## 2020-06-12 ENCOUNTER — TELEPHONE (OUTPATIENT)
Dept: RHEUMATOLOGY | Facility: CLINIC | Age: 70
End: 2020-06-12

## 2020-06-12 NOTE — TELEPHONE ENCOUNTER
----- Message from Whitley Kaur PA-C sent at 6/4/2020 12:43 PM CDT -----  Renal function is impaired, but stable compared to 4 mo ago. Ok to continue mobic for now and we will monitor this over time. Labs are otherwise essentially normal.

## 2020-06-12 NOTE — TELEPHONE ENCOUNTER
Contacted patient regarding lab results. Informed Renal function is impaired, but stable compared to 4 mo ago. Ok to continue mobic for now and we will monitor this over time. Labs are otherwise essentially normal. Patient verbalized understanding.

## 2020-07-14 ENCOUNTER — TELEPHONE (OUTPATIENT)
Dept: RHEUMATOLOGY | Facility: CLINIC | Age: 70
End: 2020-07-14

## 2020-07-14 NOTE — TELEPHONE ENCOUNTER
----- Message from Crystal Correia PharmD sent at 7/8/2020  8:43 AM CDT -----  Regarding: PsO and/or PsA treatment  Good morning Dr. Cantu and staff,     I just wanted to inform you that pt has not received PsO/PsA treatment from [facility] due to unaffordable copays. It looks like the most recent prescription we received PA for was Otezla but it still had a very high copay, $1,547 for 1 month supply. We spoke with pt on 6/24/20 and pt is over the income limit to qualify for financial assistance and medication is unaffordable for her. Please let us know if we can assist any further.       Crystal Correia, Pharm.D., CSP  Clinical Pharmacist- Inflammatory/GI  [facility]  [phone]

## 2020-08-24 DIAGNOSIS — L40.50 PSA (PSORIATIC ARTHRITIS): Primary | ICD-10-CM

## 2020-08-24 RX ORDER — SULFASALAZINE 500 MG/1
500 TABLET, DELAYED RELEASE ORAL 2 TIMES DAILY
Qty: 60 TABLET | Refills: 3 | Status: SHIPPED | OUTPATIENT
Start: 2020-08-24 | End: 2020-09-14

## 2020-08-24 NOTE — TELEPHONE ENCOUNTER
----- Message from Crystal Correia PharmD sent at 7/8/2020  8:43 AM CDT -----  Regarding: PsO and/or PsA treatment  Good morning Dr. Cantu and staff,     I just wanted to inform you that pt has not received PsO/PsA treatment from [facility] due to unaffordable copays. It looks like the most recent prescription we received PA for was Otezla but it still had a very high copay, $1,547 for 1 month supply. We spoke with pt on 6/24/20 and pt is over the income limit to qualify for financial assistance and medication is unaffordable for her. Please let us know if we can assist any further.       Crystal Correia, Pharm.D., CSP  Clinical Pharmacist- Inflammatory/GI  [facility]  [phone]

## 2020-08-24 NOTE — TELEPHONE ENCOUNTER
Contacted patient regarding sulfasalazine. Patient stated not having any joint pain. Stated only complaints is a rash. Patient is willing to go ahead and start sulfasalazine. Nurse informed will notify provider so script can be sent. Patient verbalized understanding.

## 2020-08-24 NOTE — TELEPHONE ENCOUNTER
----- Message from Crystal Correia PharmD sent at 7/8/2020  8:43 AM CDT -----  Regarding: PsO and/or PsA treatment  Good morning Dr. Cantu and staff,     I just wanted to inform you that pt has not received PsO/PsA treatment from [facility] due to unaffordable copays. It looks like the most recent prescription we received PA for was Otezla but it still had a very high copay, $1,547 for 1 month supply. We spoke with pt on 6/24/20 and pt is over the income limit to qualify for financial assistance and medication is unaffordable for her. Please let us know if we can assist any further.       Crystal Correia, Pharm.D., CSP  Clinical Pharmacist- Inflammatory/GI  [facility]  [phone]

## 2020-08-24 NOTE — TELEPHONE ENCOUNTER
Unfortunately, unable to afford stelara or otezla.     We can try  mg twice daily for her arthritis. Most common side effects are GI (nausea, vomiting etc). Let me know if she wants to start or wait until f/u with Dr. Cantu to discuss other treatment options.

## 2020-09-14 ENCOUNTER — OFFICE VISIT (OUTPATIENT)
Dept: RHEUMATOLOGY | Facility: CLINIC | Age: 70
End: 2020-09-14
Payer: MEDICARE

## 2020-09-14 VITALS
BODY MASS INDEX: 31.24 KG/M2 | TEMPERATURE: 97 F | WEIGHT: 165.38 LBS | HEART RATE: 48 BPM | DIASTOLIC BLOOD PRESSURE: 85 MMHG | SYSTOLIC BLOOD PRESSURE: 161 MMHG

## 2020-09-14 DIAGNOSIS — R79.9 ABNORMAL FINDING OF BLOOD CHEMISTRY, UNSPECIFIED: ICD-10-CM

## 2020-09-14 DIAGNOSIS — L40.50 PSA (PSORIATIC ARTHRITIS): Primary | ICD-10-CM

## 2020-09-14 DIAGNOSIS — K90.41 NCGS (NON-CELIAC GLUTEN SENSITIVITY): ICD-10-CM

## 2020-09-14 DIAGNOSIS — L40.8 PSORIASIS WITH PUSTULES: ICD-10-CM

## 2020-09-14 DIAGNOSIS — L13.0 DERMATITIS HERPETIFORMIS: ICD-10-CM

## 2020-09-14 PROCEDURE — 99215 OFFICE O/P EST HI 40 MIN: CPT | Mod: S$PBB,,, | Performed by: INTERNAL MEDICINE

## 2020-09-14 PROCEDURE — 99999 PR PBB SHADOW E&M-EST. PATIENT-LVL III: ICD-10-PCS | Mod: PBBFAC,,, | Performed by: INTERNAL MEDICINE

## 2020-09-14 PROCEDURE — 99213 OFFICE O/P EST LOW 20 MIN: CPT | Mod: PBBFAC,PO,25 | Performed by: INTERNAL MEDICINE

## 2020-09-14 PROCEDURE — 96372 THER/PROPH/DIAG INJ SC/IM: CPT | Mod: PBBFAC,PO

## 2020-09-14 PROCEDURE — 99999 PR PBB SHADOW E&M-EST. PATIENT-LVL III: CPT | Mod: PBBFAC,,, | Performed by: INTERNAL MEDICINE

## 2020-09-14 PROCEDURE — 99215 PR OFFICE/OUTPT VISIT, EST, LEVL V, 40-54 MIN: ICD-10-PCS | Mod: S$PBB,,, | Performed by: INTERNAL MEDICINE

## 2020-09-14 RX ORDER — CYANOCOBALAMIN 1000 UG/ML
1000 INJECTION, SOLUTION INTRAMUSCULAR; SUBCUTANEOUS
Status: COMPLETED | OUTPATIENT
Start: 2020-09-14 | End: 2020-09-14

## 2020-09-14 RX ORDER — ADALIMUMAB 40MG/0.4ML
40 KIT SUBCUTANEOUS
Qty: 2 PEN | Refills: 12 | Status: SHIPPED | OUTPATIENT
Start: 2020-09-14 | End: 2020-12-08

## 2020-09-14 RX ADMIN — CYANOCOBALAMIN 1000 MCG: 1000 INJECTION, SOLUTION INTRAMUSCULAR at 11:09

## 2020-09-14 NOTE — PROGRESS NOTES
Subjective:       Patient ID: Letha Enciso is a 70 y.o. female.    Chief Complaint: Disease Management and Pain    Follow up: 70 year old female psoriatic arthritis and psoriasis.NcGs she was unable to start  Stelara  Pt also started gluten free diet 3 months ago and has lost 16 lbs and noticed a significant improvement in her generalized pruritis and rash. Kenalog cream is helpful as well. She continues to have psoriasis on the palms and R lateral ankle. She denies significant small joint pain or stiffness, but does have occasional pain the R 2nd DIP. She complains of intermittent neck pain and R knee pain. R knee was evaluated by Ortho in April of last year with IACS and she was started on mobic 7.5 mg which is working quite well. She increased has fatigue and sleeping alot  Current tx:  1. mobic 7.5 mg    Review of Systems   Constitutional: Positive for activity change. Negative for appetite change and chills.   Eyes: Negative for visual disturbance.   Respiratory: Negative for cough and shortness of breath.    Cardiovascular: Negative for chest pain, palpitations and leg swelling.   Gastrointestinal: Negative for abdominal pain, constipation, diarrhea, nausea and vomiting.   Musculoskeletal: Positive for arthralgias, back pain and gait problem.   Skin: Positive for rash.   Neurological: Negative for dizziness, weakness and light-headedness.         Objective:     Vitals:    09/14/20 1018   BP: (!) 161/85   Pulse: (!) 48   Temp: 97.2 °F (36.2 °C)       Past Medical History:   Diagnosis Date    Anemia      No past surgical history on file.       Physical Exam   Constitutional: She is well-developed, well-nourished, and in no distress.   Eyes: Right conjunctiva is not injected. Left conjunctiva is not injected. Right eye exhibits normal extraocular motion. Left eye exhibits normal extraocular motion.   Neck: No JVD present. No thyromegaly present.   Cardiovascular: Normal rate.  Exam reveals no decreased  pulses.    Pulmonary/Chest: Effort normal.       Right Side Rheumatological Exam     Examination finds the shoulder, elbow, wrist, knee, 1st PIP, 1st MCP, 2nd PIP, 2nd MCP, 3rd PIP, 3rd MCP, 4th PIP, 4th MCP, 5th PIP and 5th MCP normal.    The patient is tender to palpation of the 2nd DIP    The patient has an enlarged 2nd DIP, 3rd DIP, 4th DIP and 5th DIP    Left Side Rheumatological Exam     Examination finds the shoulder, elbow, wrist, knee, 1st PIP, 1st MCP, 2nd PIP, 2nd MCP, 3rd PIP, 3rd MCP, 4th PIP, 4th MCP, 5th PIP and 5th MCP normal.    The patient has an enlarged 2nd DIP, 3rd DIP, 4th DIP and 5th DIP.      Lymphadenopathy:     She has no cervical adenopathy.   Neurological: Gait normal.   Skin: Rash (psorasis on the R palm and R lateral ankle--much improved compared to 3/2020 visit) noted.     Psychiatric: Mood and affect normal.   Musculoskeletal: Tenderness and deformity present.           Results for orders placed or performed in visit on 06/03/20   CBC auto differential   Result Value Ref Range    WBC 8.12 3.90 - 12.70 K/uL    RBC 5.15 4.00 - 5.40 M/uL    Hemoglobin 13.8 12.0 - 16.0 g/dL    Hematocrit 45.2 37.0 - 48.5 %    Mean Corpuscular Volume 88 82 - 98 fL    Mean Corpuscular Hemoglobin 26.8 (L) 27.0 - 31.0 pg    Mean Corpuscular Hemoglobin Conc 30.5 (L) 32.0 - 36.0 g/dL    RDW 13.6 11.5 - 14.5 %    Platelets 298 150 - 350 K/uL    MPV 10.9 9.2 - 12.9 fL    Immature Granulocytes 0.4 0.0 - 0.5 %    Gran # (ANC) 5.0 1.8 - 7.7 K/uL    Immature Grans (Abs) 0.03 0.00 - 0.04 K/uL    Lymph # 1.9 1.0 - 4.8 K/uL    Mono # 0.8 0.3 - 1.0 K/uL    Eos # 0.2 0.0 - 0.5 K/uL    Baso # 0.07 0.00 - 0.20 K/uL    nRBC 0 0 /100 WBC    Gran% 61.7 38.0 - 73.0 %    Lymph% 23.9 18.0 - 48.0 %    Mono% 10.3 4.0 - 15.0 %    Eosinophil% 2.8 0.0 - 8.0 %    Basophil% 0.9 0.0 - 1.9 %    Differential Method Automated    Comprehensive metabolic panel   Result Value Ref Range    Sodium 140 136 - 145 mmol/L    Potassium 4.3 3.5 - 5.1  mmol/L    Chloride 105 95 - 110 mmol/L    CO2 28 23 - 29 mmol/L    Glucose 97 70 - 110 mg/dL    BUN, Bld 16 8 - 23 mg/dL    Creatinine 1.1 0.5 - 1.4 mg/dL    Calcium 10.0 8.7 - 10.5 mg/dL    Total Protein 7.6 6.0 - 8.4 g/dL    Albumin 4.2 3.5 - 5.2 g/dL    Total Bilirubin 0.4 0.1 - 1.0 mg/dL    Alkaline Phosphatase 107 55 - 135 U/L    AST 26 10 - 40 U/L    ALT 17 10 - 44 U/L    Anion Gap 7 (L) 8 - 16 mmol/L    eGFR if African American 59.2 (A) >60 mL/min/1.73 m^2    eGFR if non  51.3 (A) >60 mL/min/1.73 m^2   C-Reactive Protein   Result Value Ref Range    CRP 1.2 0.0 - 8.2 mg/L   Sedimentation rate   Result Value Ref Range    Sed Rate 11 0 - 20 mm/Hr     reviewed labs with patient during this visit     Assessment:       1. PSA (psoriatic arthritis)    2. NCGS (non-celiac gluten sensitivity)    3. Psoriasis with pustules    4. Abnormal finding of blood chemistry, unspecified     5. Dermatitis herpetiformis            Plan:       PSA (psoriatic arthritis)  -     cyanocobalamin injection 1,000 mcg  -     adalimumab (HUMIRA,CF, PEN) 40 mg/0.4 mL PnKt; Inject 0.4 mLs (40 mg total) into the skin every 14 (fourteen) days.  Dispense: 2 pen; Refill: 12  -     CBC auto differential; Future; Expected date: 09/14/2020  -     Comprehensive metabolic panel; Future; Expected date: 09/14/2020  -     C-Reactive Protein; Future; Expected date: 09/14/2020  -     Sedimentation rate; Future; Expected date: 09/14/2020  -     adalimumab PnKt    NCGS (non-celiac gluten sensitivity)  -     cyanocobalamin injection 1,000 mcg  -     adalimumab (HUMIRA,CF, PEN) 40 mg/0.4 mL PnKt; Inject 0.4 mLs (40 mg total) into the skin every 14 (fourteen) days.  Dispense: 2 pen; Refill: 12  -     CBC auto differential; Future; Expected date: 09/14/2020  -     Comprehensive metabolic panel; Future; Expected date: 09/14/2020  -     C-Reactive Protein; Future; Expected date: 09/14/2020  -     Sedimentation rate; Future; Expected date:  09/14/2020  -     adalimumab PnKt    Psoriasis with pustules  -     cyanocobalamin injection 1,000 mcg  -     adalimumab (HUMIRA,CF, PEN) 40 mg/0.4 mL PnKt; Inject 0.4 mLs (40 mg total) into the skin every 14 (fourteen) days.  Dispense: 2 pen; Refill: 12  -     CBC auto differential; Future; Expected date: 09/14/2020  -     Comprehensive metabolic panel; Future; Expected date: 09/14/2020  -     C-Reactive Protein; Future; Expected date: 09/14/2020  -     Sedimentation rate; Future; Expected date: 09/14/2020  -     adalimumab PnKt    Abnormal finding of blood chemistry, unspecified   -     cyanocobalamin injection 1,000 mcg  -     adalimumab (HUMIRA,CF, PEN) 40 mg/0.4 mL PnKt; Inject 0.4 mLs (40 mg total) into the skin every 14 (fourteen) days.  Dispense: 2 pen; Refill: 12  -     CBC auto differential; Future; Expected date: 09/14/2020  -     Comprehensive metabolic panel; Future; Expected date: 09/14/2020  -     C-Reactive Protein; Future; Expected date: 09/14/2020  -     Sedimentation rate; Future; Expected date: 09/14/2020  -     adalimumab PnKt    Dermatitis herpetiformis  -     cyanocobalamin injection 1,000 mcg  -     adalimumab (HUMIRA,CF, PEN) 40 mg/0.4 mL PnKt; Inject 0.4 mLs (40 mg total) into the skin every 14 (fourteen) days.  Dispense: 2 pen; Refill: 12  -     CBC auto differential; Future; Expected date: 09/14/2020  -     Comprehensive metabolic panel; Future; Expected date: 09/14/2020  -     C-Reactive Protein; Future; Expected date: 09/14/2020  -     Sedimentation rate; Future; Expected date: 09/14/2020  -     adalimumab PnKt        1. Start humira  Info given as well as visually saw it  More than 50% of the  40 minute encounter was spent face to face counseling the patient regarding current status and future plan of care as well as side of the medications. All questions were answered to patient's satisfaction

## 2020-09-14 NOTE — PROGRESS NOTES
Administered 1 cc ( 1000 mcg/ml ) of b12 to the right upper outer arm. Informed of s/s to report verbalized understanding. No adverse reactions noted.      Nurse provided education and teaching of proper cleaning and administration technique. Patient was able to use teach back method with practice pen.    Administered humira to left upper outer arm. Tolerated well no signs of allergic reactions noted.

## 2020-09-15 ENCOUNTER — TELEPHONE (OUTPATIENT)
Dept: PHARMACY | Facility: CLINIC | Age: 70
End: 2020-09-15

## 2020-09-29 ENCOUNTER — TELEPHONE (OUTPATIENT)
Dept: RHEUMATOLOGY | Facility: CLINIC | Age: 70
End: 2020-09-29

## 2020-09-29 ENCOUNTER — NURSE TRIAGE (OUTPATIENT)
Dept: ADMINISTRATIVE | Facility: CLINIC | Age: 70
End: 2020-09-29

## 2020-09-29 NOTE — TELEPHONE ENCOUNTER
----- Message from Scott Keller sent at 9/29/2020  2:40 PM CDT -----  Type: Needs Medical Advice  Who Called:  Patient    Best Call Back Number:[phone]  Additional Information: Patient states that she would like a callback regarding questions about her medication:  adalimumab (HUMIRA,CF, PEN) 40 mg/0.4 mL PnKt

## 2020-09-29 NOTE — TELEPHONE ENCOUNTER
Pt needed instructions on injecting humira, I walked her through the process    Reason for Disposition   Health Information question, no triage required and triager able to answer question    Protocols used: INFORMATION ONLY CALL - NO TRIAGE-A-

## 2020-10-01 NOTE — TELEPHONE ENCOUNTER
DOCUMENTATION ONLY:     Prior authorization for Humira approved from 07/03/20 to 10/01/23.      Case ID# [id number]          Co-pay: $1546.65          Patient Assistance IS required.        Forward to patient assistance for review. CRM

## 2020-10-19 ENCOUNTER — SPECIALTY PHARMACY (OUTPATIENT)
Dept: PHARMACY | Facility: CLINIC | Age: 70
End: 2020-10-19

## 2020-10-29 ENCOUNTER — TELEPHONE (OUTPATIENT)
Dept: RHEUMATOLOGY | Facility: CLINIC | Age: 70
End: 2020-10-29

## 2020-10-29 NOTE — TELEPHONE ENCOUNTER
----- Message from Crystal Correia PharmD sent at 10/19/2020  5:43 PM CDT -----  Regarding: Humira CF  Good evening Dr. Cantu and staff,    I just wanted to let you know that we reached out to pt to check on her status for financial assistance for Humira. Pt states that she did not complete the application because she noticed that the rash on her hands and palms have worsened since she started Humira 4 weeks ago. Samples received from MD office. I advised her to speak with her rheumatologist for further guidance.     Please let me know if I can further assist.     Sincerely,   Crystal Correia, Pharm.D., CSP  Clinical Pharmacist- Inflammatory/MATT  [facility]  [phone]

## 2020-12-08 ENCOUNTER — OFFICE VISIT (OUTPATIENT)
Dept: RHEUMATOLOGY | Facility: CLINIC | Age: 70
End: 2020-12-08
Payer: MEDICARE

## 2020-12-08 VITALS
WEIGHT: 156 LBS | DIASTOLIC BLOOD PRESSURE: 67 MMHG | SYSTOLIC BLOOD PRESSURE: 147 MMHG | HEART RATE: 49 BPM | HEIGHT: 61 IN | BODY MASS INDEX: 29.45 KG/M2

## 2020-12-08 DIAGNOSIS — L40.50 PSA (PSORIATIC ARTHRITIS): Primary | ICD-10-CM

## 2020-12-08 DIAGNOSIS — L40.8 PSORIASIS WITH PUSTULES: ICD-10-CM

## 2020-12-08 DIAGNOSIS — K90.41 NCGS (NON-CELIAC GLUTEN SENSITIVITY): ICD-10-CM

## 2020-12-08 PROCEDURE — 99214 OFFICE O/P EST MOD 30 MIN: CPT | Mod: PBBFAC,PO | Performed by: PHYSICIAN ASSISTANT

## 2020-12-08 PROCEDURE — 99214 OFFICE O/P EST MOD 30 MIN: CPT | Mod: S$PBB,,, | Performed by: PHYSICIAN ASSISTANT

## 2020-12-08 PROCEDURE — 99214 PR OFFICE/OUTPT VISIT, EST, LEVL IV, 30-39 MIN: ICD-10-PCS | Mod: S$PBB,,, | Performed by: PHYSICIAN ASSISTANT

## 2020-12-08 PROCEDURE — 99999 PR PBB SHADOW E&M-EST. PATIENT-LVL IV: ICD-10-PCS | Mod: PBBFAC,,, | Performed by: PHYSICIAN ASSISTANT

## 2020-12-08 PROCEDURE — 99999 PR PBB SHADOW E&M-EST. PATIENT-LVL IV: CPT | Mod: PBBFAC,,, | Performed by: PHYSICIAN ASSISTANT

## 2020-12-08 RX ORDER — TRIAMCINOLONE ACETONIDE 1 MG/G
CREAM TOPICAL 2 TIMES DAILY
Qty: 80 G | Refills: 1 | Status: SHIPPED | OUTPATIENT
Start: 2020-12-08 | End: 2021-08-26 | Stop reason: SDUPTHER

## 2020-12-08 RX ORDER — METHOTREXATE 2.5 MG/1
7.5 TABLET ORAL
Qty: 12 TABLET | Refills: 2 | Status: SHIPPED | OUTPATIENT
Start: 2020-12-08 | End: 2021-03-22

## 2020-12-08 RX ORDER — FOLIC ACID 1 MG/1
1 TABLET ORAL DAILY
Qty: 30 TABLET | Refills: 5 | Status: SHIPPED | OUTPATIENT
Start: 2020-12-08 | End: 2021-07-01

## 2020-12-08 NOTE — PROGRESS NOTES
Subjective:       Patient ID: Letha Enciso is a 70 y.o. female.    Chief Complaint: Disease Management and Psoriatic Arthritis    Mrs. Enciso is a 70 year old female who presents to clinic for follow up on psoriatic arthritis and psoriasis. She has been doing fair since her last visit. She reports joint pain overall is minimal, but she continues to have significant psoriasis. She discontinued SSZ due to worsening rash. She has eliminated gluten from her diet, which she has found helpful. She continues to have occasional pain in her 2nd and 3rd DIP joints, but mild overall.       Current tx:  1. mobic 7.5 mg    Review of Systems   Constitutional: Positive for activity change. Negative for appetite change, chills, fatigue and fever.   Eyes: Negative for visual disturbance.   Respiratory: Negative for cough and shortness of breath.    Cardiovascular: Negative for chest pain, palpitations and leg swelling.   Gastrointestinal: Negative for abdominal pain, constipation, diarrhea, nausea and vomiting.   Musculoskeletal: Positive for arthralgias.   Skin: Positive for rash.   Neurological: Negative for dizziness, weakness, light-headedness and headaches.         Objective:     Vitals:    12/08/20 0900   BP: (!) 147/67   Pulse: (!) 49       Past Medical History:   Diagnosis Date    Anemia      History reviewed. No pertinent surgical history.       Physical Exam   Constitutional: She is well-developed, well-nourished, and in no distress.   Eyes: Right conjunctiva is not injected. Left conjunctiva is not injected. Right eye exhibits normal extraocular motion. Left eye exhibits normal extraocular motion.   Neck: No JVD present. No thyromegaly present.   Cardiovascular: Normal rate.  Exam reveals no decreased pulses.    Pulmonary/Chest: Effort normal.       Right Side Rheumatological Exam     Examination finds the shoulder, elbow, wrist, knee, 1st PIP, 1st MCP, 2nd PIP, 2nd MCP, 3rd PIP, 3rd MCP, 4th PIP, 4th MCP, 5th  PIP and 5th MCP normal.    The patient is tender to palpation of the 2nd DIP    The patient has an enlarged 2nd DIP, 3rd DIP, 4th DIP and 5th DIP    Left Side Rheumatological Exam     Examination finds the shoulder, elbow, wrist, knee, 1st PIP, 1st MCP, 2nd PIP, 2nd MCP, 3rd PIP, 3rd MCP, 4th PIP, 4th MCP, 5th PIP and 5th MCP normal.    The patient has an enlarged 2nd DIP, 3rd DIP, 4th DIP and 5th DIP.      Lymphadenopathy:     She has no cervical adenopathy.   Neurological: Gait normal.   Skin: Rash (psorasis on the R palm and R lateral ankle--much improved compared to 3/2020 visit) noted.     Psychiatric: Mood and affect normal.               Recent labs reviewed:  Component      Latest Ref Rng & Units 6/3/2020   WBC      3.90 - 12.70 K/uL 8.12   RBC      4.00 - 5.40 M/uL 5.15   Hemoglobin      12.0 - 16.0 g/dL 13.8   Hematocrit      37.0 - 48.5 % 45.2   MCV      82 - 98 fL 88   MCH      27.0 - 31.0 pg 26.8 (L)   MCHC      32.0 - 36.0 g/dL 30.5 (L)   RDW      11.5 - 14.5 % 13.6   Platelets      150 - 350 K/uL 298   MPV      9.2 - 12.9 fL 10.9   Immature Granulocytes      0.0 - 0.5 % 0.4   Gran # (ANC)      1.8 - 7.7 K/uL 5.0   Immature Grans (Abs)      0.00 - 0.04 K/uL 0.03   Lymph #      1.0 - 4.8 K/uL 1.9   Mono #      0.3 - 1.0 K/uL 0.8   Eos #      0.0 - 0.5 K/uL 0.2   Baso #      0.00 - 0.20 K/uL 0.07   nRBC      0 /100 WBC 0   Gran %      38.0 - 73.0 % 61.7   Lymph %      18.0 - 48.0 % 23.9   Mono %      4.0 - 15.0 % 10.3   Eosinophil %      0.0 - 8.0 % 2.8   Basophil %      0.0 - 1.9 % 0.9   Differential Method       Automated   Sodium      136 - 145 mmol/L 140   Potassium      3.5 - 5.1 mmol/L 4.3   Chloride      95 - 110 mmol/L 105   CO2      23 - 29 mmol/L 28   Glucose      70 - 110 mg/dL 97   BUN      8 - 23 mg/dL 16   Creatinine      0.5 - 1.4 mg/dL 1.1   Calcium      8.7 - 10.5 mg/dL 10.0   PROTEIN TOTAL      6.0 - 8.4 g/dL 7.6   Albumin      3.5 - 5.2 g/dL 4.2   BILIRUBIN TOTAL      0.1 - 1.0 mg/dL  0.4   Alkaline Phosphatase      55 - 135 U/L 107   AST      10 - 40 U/L 26   ALT      10 - 44 U/L 17   Anion Gap      8 - 16 mmol/L 7 (L)   eGFR if African American      >60 mL/min/1.73 m:2 59.2 (A)   eGFR if non African American      >60 mL/min/1.73 m:2 51.3 (A)   CRP      0.0 - 8.2 mg/L 1.2   Sed Rate      0 - 20 mm/Hr 11     Assessment:       1. PSA (psoriatic arthritis)    2. Psoriasis with pustules    3. NCGS (non-celiac gluten sensitivity)            Plan:       PSA (psoriatic arthritis)  -     methotrexate 2.5 MG Tab; Take 3 tablets (7.5 mg total) by mouth every 7 days.  Dispense: 12 tablet; Refill: 2  -     folic acid (FOLVITE) 1 MG tablet; Take 1 tablet (1 mg total) by mouth once daily.  Dispense: 30 tablet; Refill: 5  -     CBC Auto Differential; Future; Expected date: 12/08/2020  -     Comprehensive Metabolic Panel; Future; Expected date: 12/08/2020  -     C-Reactive Protein; Future; Expected date: 12/08/2020  -     Sedimentation rate; Future; Expected date: 12/08/2020    Psoriasis with pustules    NCGS (non-celiac gluten sensitivity)    Other orders  -     triamcinolone acetonide 0.1% (KENALOG) 0.1 % cream; Apply topically 2 (two) times daily.  Dispense: 80 g; Refill: 1        Assessment:  70 year old female with  Psoriatic arthritis, psoriasis, hx of MICHAEL positive, histone antibody positive  --hx of XANDER s/p infusion  --NCGS  --renal insufficiency    Plan:  1. Start MTX 3 tabs once weekly. Start folic acid 1 mg daily  2. Add kenalog cream prn psoriasis  3. Labs today    The patient is aware that there is a COVID-19 pandemic and that the immunosuppressants being taken to treat arthritis can increased the  risk for infection/Viruses.  This patient understands  to hold (not to take) all DMARD/Immunsuppressants with the exception of Plaquenil,  if having fever chills, cough, shortness of breath loss of sense of smell and or myalgias.  It is understood that the patient is to call the office as well as call  their primary care physician and observe  social isolation        Follow up:  3 months Dr. Cantu

## 2020-12-13 ASSESSMENT — ROUTINE ASSESSMENT OF PATIENT INDEX DATA (RAPID3)
PSYCHOLOGICAL DISTRESS SCORE: 0
PAIN SCORE: 0
MDHAQ FUNCTION SCORE: 0.2
PATIENT GLOBAL ASSESSMENT SCORE: 0
TOTAL RAPID3 SCORE: 0.22
FATIGUE SCORE: 0

## 2021-03-22 ENCOUNTER — OFFICE VISIT (OUTPATIENT)
Dept: RHEUMATOLOGY | Facility: CLINIC | Age: 71
End: 2021-03-22
Payer: MEDICARE

## 2021-03-22 ENCOUNTER — LAB VISIT (OUTPATIENT)
Dept: LAB | Facility: HOSPITAL | Age: 71
End: 2021-03-22
Attending: PHYSICIAN ASSISTANT
Payer: MEDICARE

## 2021-03-22 VITALS
HEART RATE: 54 BPM | DIASTOLIC BLOOD PRESSURE: 82 MMHG | SYSTOLIC BLOOD PRESSURE: 150 MMHG | WEIGHT: 157 LBS | HEIGHT: 61 IN | BODY MASS INDEX: 29.64 KG/M2

## 2021-03-22 DIAGNOSIS — K90.41 NCGS (NON-CELIAC GLUTEN SENSITIVITY): ICD-10-CM

## 2021-03-22 DIAGNOSIS — R79.9 ABNORMAL FINDING OF BLOOD CHEMISTRY, UNSPECIFIED: ICD-10-CM

## 2021-03-22 DIAGNOSIS — L40.8 PSORIASIS WITH PUSTULES: ICD-10-CM

## 2021-03-22 DIAGNOSIS — L40.50 PSA (PSORIATIC ARTHRITIS): ICD-10-CM

## 2021-03-22 DIAGNOSIS — L40.50 PSA (PSORIATIC ARTHRITIS): Primary | ICD-10-CM

## 2021-03-22 LAB
ALBUMIN SERPL BCP-MCNC: 4.4 G/DL (ref 3.5–5.2)
ALP SERPL-CCNC: 108 U/L (ref 55–135)
ALT SERPL W/O P-5'-P-CCNC: 14 U/L (ref 10–44)
ANION GAP SERPL CALC-SCNC: 6 MMOL/L (ref 8–16)
AST SERPL-CCNC: 24 U/L (ref 10–40)
BASOPHILS # BLD AUTO: 0.06 K/UL (ref 0–0.2)
BASOPHILS NFR BLD: 0.7 % (ref 0–1.9)
BILIRUB SERPL-MCNC: 0.6 MG/DL (ref 0.1–1)
BUN SERPL-MCNC: 12 MG/DL (ref 8–23)
CALCIUM SERPL-MCNC: 10 MG/DL (ref 8.7–10.5)
CHLORIDE SERPL-SCNC: 109 MMOL/L (ref 95–110)
CO2 SERPL-SCNC: 27 MMOL/L (ref 23–29)
CREAT SERPL-MCNC: 1 MG/DL (ref 0.5–1.4)
CRP SERPL-MCNC: 0.6 MG/L (ref 0–8.2)
DIFFERENTIAL METHOD: NORMAL
EOSINOPHIL # BLD AUTO: 0.3 K/UL (ref 0–0.5)
EOSINOPHIL NFR BLD: 3.3 % (ref 0–8)
ERYTHROCYTE [DISTWIDTH] IN BLOOD BY AUTOMATED COUNT: 13.6 % (ref 11.5–14.5)
ERYTHROCYTE [SEDIMENTATION RATE] IN BLOOD BY WESTERGREN METHOD: 7 MM/HR (ref 0–20)
EST. GFR  (AFRICAN AMERICAN): >60 ML/MIN/1.73 M^2
EST. GFR  (NON AFRICAN AMERICAN): 57.2 ML/MIN/1.73 M^2
GLUCOSE SERPL-MCNC: 92 MG/DL (ref 70–110)
HCT VFR BLD AUTO: 41.7 % (ref 37–48.5)
HGB BLD-MCNC: 13.6 G/DL (ref 12–16)
IMM GRANULOCYTES # BLD AUTO: 0.02 K/UL (ref 0–0.04)
IMM GRANULOCYTES NFR BLD AUTO: 0.2 % (ref 0–0.5)
LYMPHOCYTES # BLD AUTO: 2.1 K/UL (ref 1–4.8)
LYMPHOCYTES NFR BLD: 26 % (ref 18–48)
MCH RBC QN AUTO: 28.3 PG (ref 27–31)
MCHC RBC AUTO-ENTMCNC: 32.6 G/DL (ref 32–36)
MCV RBC AUTO: 87 FL (ref 82–98)
MONOCYTES # BLD AUTO: 0.8 K/UL (ref 0.3–1)
MONOCYTES NFR BLD: 9.5 % (ref 4–15)
NEUTROPHILS # BLD AUTO: 4.9 K/UL (ref 1.8–7.7)
NEUTROPHILS NFR BLD: 60.3 % (ref 38–73)
NRBC BLD-RTO: 0 /100 WBC
PLATELET # BLD AUTO: 262 K/UL (ref 150–350)
PMV BLD AUTO: 11.3 FL (ref 9.2–12.9)
POTASSIUM SERPL-SCNC: 4.2 MMOL/L (ref 3.5–5.1)
PROT SERPL-MCNC: 7.8 G/DL (ref 6–8.4)
RBC # BLD AUTO: 4.81 M/UL (ref 4–5.4)
SODIUM SERPL-SCNC: 142 MMOL/L (ref 136–145)
WBC # BLD AUTO: 8.11 K/UL (ref 3.9–12.7)

## 2021-03-22 PROCEDURE — 99213 OFFICE O/P EST LOW 20 MIN: CPT | Mod: PBBFAC,PO | Performed by: INTERNAL MEDICINE

## 2021-03-22 PROCEDURE — 85651 RBC SED RATE NONAUTOMATED: CPT | Mod: PO | Performed by: PHYSICIAN ASSISTANT

## 2021-03-22 PROCEDURE — 80053 COMPREHEN METABOLIC PANEL: CPT | Performed by: PHYSICIAN ASSISTANT

## 2021-03-22 PROCEDURE — 86769 SARS-COV-2 COVID-19 ANTIBODY: CPT | Performed by: INTERNAL MEDICINE

## 2021-03-22 PROCEDURE — 86480 TB TEST CELL IMMUN MEASURE: CPT | Performed by: INTERNAL MEDICINE

## 2021-03-22 PROCEDURE — 86705 HEP B CORE ANTIBODY IGM: CPT | Performed by: INTERNAL MEDICINE

## 2021-03-22 PROCEDURE — 86140 C-REACTIVE PROTEIN: CPT | Performed by: PHYSICIAN ASSISTANT

## 2021-03-22 PROCEDURE — 99999 PR PBB SHADOW E&M-EST. PATIENT-LVL III: CPT | Mod: PBBFAC,,, | Performed by: INTERNAL MEDICINE

## 2021-03-22 PROCEDURE — 86803 HEPATITIS C AB TEST: CPT | Performed by: INTERNAL MEDICINE

## 2021-03-22 PROCEDURE — 86706 HEP B SURFACE ANTIBODY: CPT | Performed by: INTERNAL MEDICINE

## 2021-03-22 PROCEDURE — 99214 OFFICE O/P EST MOD 30 MIN: CPT | Mod: S$PBB,,, | Performed by: INTERNAL MEDICINE

## 2021-03-22 PROCEDURE — 99999 PR PBB SHADOW E&M-EST. PATIENT-LVL III: ICD-10-PCS | Mod: PBBFAC,,, | Performed by: INTERNAL MEDICINE

## 2021-03-22 PROCEDURE — 36415 COLL VENOUS BLD VENIPUNCTURE: CPT | Mod: PO | Performed by: PHYSICIAN ASSISTANT

## 2021-03-22 PROCEDURE — 99214 PR OFFICE/OUTPT VISIT, EST, LEVL IV, 30-39 MIN: ICD-10-PCS | Mod: S$PBB,,, | Performed by: INTERNAL MEDICINE

## 2021-03-22 PROCEDURE — 85025 COMPLETE CBC W/AUTO DIFF WBC: CPT | Performed by: PHYSICIAN ASSISTANT

## 2021-03-22 PROCEDURE — 87340 HEPATITIS B SURFACE AG IA: CPT | Performed by: INTERNAL MEDICINE

## 2021-03-22 RX ORDER — USTEKINUMAB 45 MG/.5ML
45 INJECTION, SOLUTION SUBCUTANEOUS
Qty: 0.5 ML | Refills: 3 | COMMUNITY
Start: 2021-03-22 | End: 2021-07-01

## 2021-03-22 RX ORDER — MELOXICAM 7.5 MG/1
7.5 TABLET ORAL DAILY
Qty: 90 TABLET | Refills: 3 | Status: SHIPPED | OUTPATIENT
Start: 2021-03-22 | End: 2021-06-20

## 2021-03-22 RX ORDER — ERGOCALCIFEROL 1.25 MG/1
50000 CAPSULE ORAL
COMMUNITY
Start: 2020-11-03 | End: 2021-07-01

## 2021-03-22 ASSESSMENT — ROUTINE ASSESSMENT OF PATIENT INDEX DATA (RAPID3)
TOTAL RAPID3 SCORE: 0.44
MDHAQ FUNCTION SCORE: 0.1
PAIN SCORE: 0.5
PSYCHOLOGICAL DISTRESS SCORE: 0
PATIENT GLOBAL ASSESSMENT SCORE: 0.5
FATIGUE SCORE: 0

## 2021-03-23 LAB
HBV CORE IGM SERPL QL IA: NEGATIVE
HBV SURFACE AB SER-ACNC: NEGATIVE M[IU]/ML
HBV SURFACE AG SERPL QL IA: NEGATIVE
HCV AB SERPL QL IA: NEGATIVE
SARS-COV-2 IGG SERPLBLD QL IA.RAPID: POSITIVE

## 2021-03-24 LAB
GAMMA INTERFERON BACKGROUND BLD IA-ACNC: 0.03 IU/ML
M TB IFN-G CD4+ BCKGRND COR BLD-ACNC: 0.01 IU/ML
MITOGEN IGNF BCKGRD COR BLD-ACNC: 9.98 IU/ML
TB GOLD PLUS: NEGATIVE
TB2 - NIL: 0.01 IU/ML

## 2021-03-25 ENCOUNTER — SPECIALTY PHARMACY (OUTPATIENT)
Dept: PHARMACY | Facility: CLINIC | Age: 71
End: 2021-03-25

## 2021-06-18 ENCOUNTER — LAB VISIT (OUTPATIENT)
Dept: LAB | Facility: HOSPITAL | Age: 71
End: 2021-06-18
Attending: INTERNAL MEDICINE
Payer: MEDICARE

## 2021-06-18 DIAGNOSIS — R79.9 ABNORMAL FINDING OF BLOOD CHEMISTRY, UNSPECIFIED: ICD-10-CM

## 2021-06-18 DIAGNOSIS — L40.8 PSORIASIS WITH PUSTULES: ICD-10-CM

## 2021-06-18 DIAGNOSIS — L40.50 PSA (PSORIATIC ARTHRITIS): ICD-10-CM

## 2021-06-18 DIAGNOSIS — K90.41 NCGS (NON-CELIAC GLUTEN SENSITIVITY): ICD-10-CM

## 2021-06-18 LAB
25(OH)D3+25(OH)D2 SERPL-MCNC: 53 NG/ML (ref 30–96)
ALBUMIN SERPL BCP-MCNC: 4 G/DL (ref 3.5–5.2)
ALP SERPL-CCNC: 93 U/L (ref 55–135)
ALT SERPL W/O P-5'-P-CCNC: 26 U/L (ref 10–44)
ANION GAP SERPL CALC-SCNC: 11 MMOL/L (ref 8–16)
AST SERPL-CCNC: 34 U/L (ref 10–40)
BASOPHILS # BLD AUTO: 0.06 K/UL (ref 0–0.2)
BASOPHILS NFR BLD: 0.9 % (ref 0–1.9)
BILIRUB SERPL-MCNC: 0.3 MG/DL (ref 0.1–1)
BUN SERPL-MCNC: 15 MG/DL (ref 8–23)
CALCIUM SERPL-MCNC: 9.8 MG/DL (ref 8.7–10.5)
CHLORIDE SERPL-SCNC: 106 MMOL/L (ref 95–110)
CO2 SERPL-SCNC: 24 MMOL/L (ref 23–29)
CREAT SERPL-MCNC: 1 MG/DL (ref 0.5–1.4)
CRP SERPL-MCNC: 0.5 MG/L (ref 0–8.2)
DIFFERENTIAL METHOD: ABNORMAL
EOSINOPHIL # BLD AUTO: 0.2 K/UL (ref 0–0.5)
EOSINOPHIL NFR BLD: 2.6 % (ref 0–8)
ERYTHROCYTE [DISTWIDTH] IN BLOOD BY AUTOMATED COUNT: 13.1 % (ref 11.5–14.5)
ERYTHROCYTE [SEDIMENTATION RATE] IN BLOOD BY WESTERGREN METHOD: 11 MM/HR (ref 0–20)
EST. GFR  (AFRICAN AMERICAN): >60 ML/MIN/1.73 M^2
EST. GFR  (NON AFRICAN AMERICAN): 57.2 ML/MIN/1.73 M^2
GLUCOSE SERPL-MCNC: 94 MG/DL (ref 70–110)
HCT VFR BLD AUTO: 38 % (ref 37–48.5)
HGB BLD-MCNC: 12 G/DL (ref 12–16)
IMM GRANULOCYTES # BLD AUTO: 0.02 K/UL (ref 0–0.04)
IMM GRANULOCYTES NFR BLD AUTO: 0.3 % (ref 0–0.5)
LYMPHOCYTES # BLD AUTO: 1.5 K/UL (ref 1–4.8)
LYMPHOCYTES NFR BLD: 21.7 % (ref 18–48)
MCH RBC QN AUTO: 26.7 PG (ref 27–31)
MCHC RBC AUTO-ENTMCNC: 31.6 G/DL (ref 32–36)
MCV RBC AUTO: 85 FL (ref 82–98)
MONOCYTES # BLD AUTO: 0.7 K/UL (ref 0.3–1)
MONOCYTES NFR BLD: 10 % (ref 4–15)
NEUTROPHILS # BLD AUTO: 4.5 K/UL (ref 1.8–7.7)
NEUTROPHILS NFR BLD: 64.5 % (ref 38–73)
NRBC BLD-RTO: 0 /100 WBC
PLATELET # BLD AUTO: 279 K/UL (ref 150–450)
PMV BLD AUTO: 10.8 FL (ref 9.2–12.9)
POTASSIUM SERPL-SCNC: 4.3 MMOL/L (ref 3.5–5.1)
PROT SERPL-MCNC: 7.1 G/DL (ref 6–8.4)
RBC # BLD AUTO: 4.49 M/UL (ref 4–5.4)
SODIUM SERPL-SCNC: 141 MMOL/L (ref 136–145)
WBC # BLD AUTO: 7 K/UL (ref 3.9–12.7)

## 2021-06-18 PROCEDURE — 85651 RBC SED RATE NONAUTOMATED: CPT | Mod: PO | Performed by: INTERNAL MEDICINE

## 2021-06-18 PROCEDURE — 85025 COMPLETE CBC W/AUTO DIFF WBC: CPT | Performed by: INTERNAL MEDICINE

## 2021-06-18 PROCEDURE — 86140 C-REACTIVE PROTEIN: CPT | Performed by: INTERNAL MEDICINE

## 2021-06-18 PROCEDURE — 82306 VITAMIN D 25 HYDROXY: CPT | Performed by: INTERNAL MEDICINE

## 2021-06-18 PROCEDURE — 36415 COLL VENOUS BLD VENIPUNCTURE: CPT | Mod: PO | Performed by: INTERNAL MEDICINE

## 2021-06-18 PROCEDURE — 80053 COMPREHEN METABOLIC PANEL: CPT | Performed by: INTERNAL MEDICINE

## 2021-06-21 ENCOUNTER — TELEPHONE (OUTPATIENT)
Dept: RHEUMATOLOGY | Facility: CLINIC | Age: 71
End: 2021-06-21

## 2021-07-01 ENCOUNTER — OFFICE VISIT (OUTPATIENT)
Dept: RHEUMATOLOGY | Facility: CLINIC | Age: 71
End: 2021-07-01
Payer: MEDICARE

## 2021-07-01 VITALS
DIASTOLIC BLOOD PRESSURE: 78 MMHG | HEART RATE: 71 BPM | HEIGHT: 61 IN | SYSTOLIC BLOOD PRESSURE: 161 MMHG | WEIGHT: 155.88 LBS | BODY MASS INDEX: 29.43 KG/M2

## 2021-07-01 DIAGNOSIS — L40.50 PSA (PSORIATIC ARTHRITIS): Primary | ICD-10-CM

## 2021-07-01 DIAGNOSIS — K90.41 NCGS (NON-CELIAC GLUTEN SENSITIVITY): ICD-10-CM

## 2021-07-01 DIAGNOSIS — L40.8 PSORIASIS WITH PUSTULES: ICD-10-CM

## 2021-07-01 PROCEDURE — 99214 OFFICE O/P EST MOD 30 MIN: CPT | Mod: S$PBB,,, | Performed by: PHYSICIAN ASSISTANT

## 2021-07-01 PROCEDURE — 99213 OFFICE O/P EST LOW 20 MIN: CPT | Mod: PBBFAC,PN | Performed by: PHYSICIAN ASSISTANT

## 2021-07-01 PROCEDURE — 99999 PR PBB SHADOW E&M-EST. PATIENT-LVL III: CPT | Mod: PBBFAC,,, | Performed by: PHYSICIAN ASSISTANT

## 2021-07-01 PROCEDURE — 99999 PR PBB SHADOW E&M-EST. PATIENT-LVL III: ICD-10-PCS | Mod: PBBFAC,,, | Performed by: PHYSICIAN ASSISTANT

## 2021-07-01 PROCEDURE — 99214 PR OFFICE/OUTPT VISIT, EST, LEVL IV, 30-39 MIN: ICD-10-PCS | Mod: S$PBB,,, | Performed by: PHYSICIAN ASSISTANT

## 2021-07-01 RX ORDER — FAMOTIDINE 40 MG/1
40 TABLET, FILM COATED ORAL DAILY
Qty: 90 TABLET | Refills: 1 | Status: SHIPPED | OUTPATIENT
Start: 2021-07-01 | End: 2022-07-01

## 2021-07-01 RX ORDER — MELOXICAM 7.5 MG/1
7.5 TABLET ORAL DAILY
Qty: 90 TABLET | Refills: 1 | Status: SHIPPED | OUTPATIENT
Start: 2021-07-01 | End: 2022-04-04 | Stop reason: SDUPTHER

## 2021-07-01 ASSESSMENT — ROUTINE ASSESSMENT OF PATIENT INDEX DATA (RAPID3)
PATIENT GLOBAL ASSESSMENT SCORE: 1
MDHAQ FUNCTION SCORE: 0.2
FATIGUE SCORE: 0
PSYCHOLOGICAL DISTRESS SCORE: 0
TOTAL RAPID3 SCORE: 0.89
PAIN SCORE: 1

## 2021-08-26 RX ORDER — TRIAMCINOLONE ACETONIDE 1 MG/G
CREAM TOPICAL 2 TIMES DAILY
Qty: 80 G | Refills: 1 | Status: SHIPPED | OUTPATIENT
Start: 2021-08-26 | End: 2023-10-31

## 2021-09-23 ENCOUNTER — OFFICE VISIT (OUTPATIENT)
Dept: RHEUMATOLOGY | Facility: CLINIC | Age: 71
End: 2021-09-23
Payer: MEDICARE

## 2021-09-23 VITALS
WEIGHT: 158.38 LBS | BODY MASS INDEX: 29.9 KG/M2 | HEIGHT: 61 IN | HEART RATE: 73 BPM | DIASTOLIC BLOOD PRESSURE: 71 MMHG | SYSTOLIC BLOOD PRESSURE: 156 MMHG

## 2021-09-23 DIAGNOSIS — L13.0 DERMATITIS HERPETIFORMIS: ICD-10-CM

## 2021-09-23 DIAGNOSIS — L40.8 PSORIASIS WITH PUSTULES: ICD-10-CM

## 2021-09-23 DIAGNOSIS — L40.50 PSA (PSORIATIC ARTHRITIS): Primary | ICD-10-CM

## 2021-09-23 DIAGNOSIS — K90.41 NCGS (NON-CELIAC GLUTEN SENSITIVITY): ICD-10-CM

## 2021-09-23 DIAGNOSIS — F33.9 EPISODE OF RECURRENT MAJOR DEPRESSIVE DISORDER, UNSPECIFIED DEPRESSION EPISODE SEVERITY: ICD-10-CM

## 2021-09-23 DIAGNOSIS — D84.9 IMMUNE DEFICIENCY DISORDER: ICD-10-CM

## 2021-09-23 PROCEDURE — 99215 PR OFFICE/OUTPT VISIT, EST, LEVL V, 40-54 MIN: ICD-10-PCS | Mod: S$PBB,,, | Performed by: INTERNAL MEDICINE

## 2021-09-23 PROCEDURE — 99999 PR PBB SHADOW E&M-EST. PATIENT-LVL III: ICD-10-PCS | Mod: PBBFAC,,, | Performed by: INTERNAL MEDICINE

## 2021-09-23 PROCEDURE — 99999 PR PBB SHADOW E&M-EST. PATIENT-LVL III: CPT | Mod: PBBFAC,,, | Performed by: INTERNAL MEDICINE

## 2021-09-23 PROCEDURE — 99215 OFFICE O/P EST HI 40 MIN: CPT | Mod: S$PBB,,, | Performed by: INTERNAL MEDICINE

## 2021-09-23 PROCEDURE — 99213 OFFICE O/P EST LOW 20 MIN: CPT | Mod: PBBFAC,PN | Performed by: INTERNAL MEDICINE

## 2021-09-23 RX ORDER — SERTRALINE HYDROCHLORIDE 50 MG/1
50 TABLET, FILM COATED ORAL NIGHTLY
Qty: 90 TABLET | Refills: 3 | Status: SHIPPED | OUTPATIENT
Start: 2021-09-23 | End: 2022-11-06

## 2021-09-23 RX ORDER — IXEKIZUMAB 80 MG/ML
160 INJECTION, SOLUTION SUBCUTANEOUS ONCE
Qty: 2 ML | Refills: 0 | COMMUNITY
Start: 2021-09-23 | End: 2021-09-23

## 2021-09-23 RX ORDER — IXEKIZUMAB 80 MG/ML
80 INJECTION, SOLUTION SUBCUTANEOUS
Qty: 1 ML | Refills: 11 | COMMUNITY
Start: 2021-09-23 | End: 2022-10-06

## 2021-09-23 RX ORDER — PREDNISONE 2.5 MG/1
2.5 TABLET ORAL DAILY PRN
Qty: 30 TABLET | Refills: 1 | Status: SHIPPED | OUTPATIENT
Start: 2021-09-23 | End: 2021-10-23

## 2021-09-30 ENCOUNTER — SPECIALTY PHARMACY (OUTPATIENT)
Dept: PHARMACY | Facility: CLINIC | Age: 71
End: 2021-09-30
Payer: MEDICARE

## 2021-09-30 NOTE — TELEPHONE ENCOUNTER
Informed patient that [facility] received a prescription for Taltz and is working on insurance approval. PA is pending OV completion.

## 2021-10-05 NOTE — TELEPHONE ENCOUNTER
Taltz PA approved 7/7/21-10/5/22. No case ID provided, test claim running through for both maintenance and loading dose.     Loading dose test claim: $2,603.22, Maintenance dose test claim: $1,699.82. Forwarding to benefits investigation and financial assistance for review.

## 2021-10-05 NOTE — TELEPHONE ENCOUNTER
Taltz maintenance dose PA submitted via CM  (Key: [id number]). Unable to submit loading dose PA - will ensure the coverage is for both loading and maintenance dose.

## 2021-10-06 NOTE — TELEPHONE ENCOUNTER
BENEFIT INVESTIGATION:  TALTZ    [facility] Medicare plan.   [facility] in network  Patient is in catastrophic stage of coverage.   Estimated co pay: $2603.22 // $1699.82  Co pay assistance required.   Forwarded to FA team for review.         MCP

## 2022-01-18 ENCOUNTER — OFFICE VISIT (OUTPATIENT)
Dept: RHEUMATOLOGY | Facility: CLINIC | Age: 72
End: 2022-01-18
Payer: MEDICARE

## 2022-01-18 VITALS
SYSTOLIC BLOOD PRESSURE: 148 MMHG | HEART RATE: 68 BPM | HEIGHT: 61 IN | WEIGHT: 158 LBS | BODY MASS INDEX: 29.83 KG/M2 | DIASTOLIC BLOOD PRESSURE: 71 MMHG

## 2022-01-18 DIAGNOSIS — K90.41 NCGS (NON-CELIAC GLUTEN SENSITIVITY): ICD-10-CM

## 2022-01-18 DIAGNOSIS — Z79.899 IMMUNOCOMPROMISED STATE DUE TO DRUG THERAPY: ICD-10-CM

## 2022-01-18 DIAGNOSIS — L40.8 PSORIASIS WITH PUSTULES: ICD-10-CM

## 2022-01-18 DIAGNOSIS — D84.821 IMMUNOCOMPROMISED STATE DUE TO DRUG THERAPY: ICD-10-CM

## 2022-01-18 DIAGNOSIS — L40.50 PSA (PSORIATIC ARTHRITIS): Primary | ICD-10-CM

## 2022-01-18 PROCEDURE — 99999 PR PBB SHADOW E&M-EST. PATIENT-LVL III: CPT | Mod: PBBFAC,,, | Performed by: PHYSICIAN ASSISTANT

## 2022-01-18 PROCEDURE — 99999 PR PBB SHADOW E&M-EST. PATIENT-LVL III: ICD-10-PCS | Mod: PBBFAC,,, | Performed by: PHYSICIAN ASSISTANT

## 2022-01-18 PROCEDURE — 99214 PR OFFICE/OUTPT VISIT, EST, LEVL IV, 30-39 MIN: ICD-10-PCS | Mod: S$PBB,,, | Performed by: PHYSICIAN ASSISTANT

## 2022-01-18 PROCEDURE — 99214 OFFICE O/P EST MOD 30 MIN: CPT | Mod: S$PBB,,, | Performed by: PHYSICIAN ASSISTANT

## 2022-01-18 PROCEDURE — 99213 OFFICE O/P EST LOW 20 MIN: CPT | Mod: PBBFAC,PN | Performed by: PHYSICIAN ASSISTANT

## 2022-01-18 ASSESSMENT — ROUTINE ASSESSMENT OF PATIENT INDEX DATA (RAPID3)
PATIENT GLOBAL ASSESSMENT SCORE: 1
PSYCHOLOGICAL DISTRESS SCORE: 0
MDHAQ FUNCTION SCORE: 0
PAIN SCORE: 2.5
TOTAL RAPID3 SCORE: 1.17
FATIGUE SCORE: 0

## 2022-01-18 NOTE — PROGRESS NOTES
Subjective:       Patient ID: Letha Enciso is a 72 y.o. female.    Chief Complaint: Disease Management    Mrs. Enciso is a 72 year old female who presents to clinic for follow up on psoriatic arthritis and psoriasis. She was given taltz samples at her last visit and she completed 3 injections. She noticed an improvement in her skin and pain level on treatment, but rash has returned on her R hand since missed dose in December. She is not sure if this was approved by her insurance or if it is affordable. She has stiffness and pain in her hands, but this is mild overall. She is still avoiding gluten. She has new pain in the R thigh that occurs when moving from seated to standing and then improves and also occurs at night. No low back pain or lower ext weakness. No lateral hip pain. No injury. Pain is not severe enough that she is interested in taking muscle relaxants or to consider PT at this time.    She had covid infection 2 weeks ago with mild symptoms. She has covid last year as well and received MAB infusion at that time.       Current tx:  1. mobic 7.5 mg  2. taltz (3 months)    Review of Systems   Constitutional: Positive for activity change. Negative for appetite change, chills, fatigue and fever.   Eyes: Negative for visual disturbance.   Respiratory: Negative for cough and shortness of breath.    Cardiovascular: Negative for chest pain, palpitations and leg swelling.   Gastrointestinal: Negative for abdominal pain, constipation, diarrhea, nausea and vomiting.   Musculoskeletal: Positive for arthralgias.   Skin: Positive for rash.   Neurological: Negative for dizziness, weakness, light-headedness and headaches.         Objective:     Vitals:    01/18/22 0846   BP: (!) 148/71   Pulse: 68       Past Medical History:   Diagnosis Date    Anemia      History reviewed. No pertinent surgical history.       Physical Exam   Eyes: Right conjunctiva is not injected. Left conjunctiva is not injected. Right eye  exhibits normal extraocular motion. Left eye exhibits normal extraocular motion.   Neck: No JVD present. No thyromegaly present.   Cardiovascular: Normal rate. Exam reveals no decreased pulses.   Pulmonary/Chest: Effort normal.   Musculoskeletal:      Right shoulder: Normal.      Left shoulder: Normal.      Right elbow: Normal.      Left elbow: Normal.      Right wrist: Normal.      Left wrist: Normal.      Right knee: Normal.      Left knee: Normal.   Lymphadenopathy:     She has no cervical adenopathy.   Neurological: Gait normal.   Skin: Rash (psoriasis on the palms (r hand >L)) noted.   Psychiatric: Mood and affect normal.       Right Side Rheumatological Exam     Examination finds the shoulder, elbow, wrist, knee, 1st PIP, 1st MCP, 2nd PIP, 2nd MCP, 3rd PIP, 3rd MCP, 4th PIP, 4th MCP, 5th PIP and 5th MCP normal.    The patient is tender to palpation of the 2nd DIP    The patient has an enlarged 2nd DIP, 3rd DIP, 4th DIP and 5th DIP    Left Side Rheumatological Exam     Examination finds the shoulder, elbow, wrist, knee, 1st PIP, 1st MCP, 2nd PIP, 2nd MCP, 3rd PIP, 3rd MCP, 4th PIP, 4th MCP, 5th PIP and 5th MCP normal.    The patient has an enlarged 2nd DIP, 3rd DIP, 4th DIP and 5th DIP.           Recent labs reviewed:  Component      Latest Ref Rng & Units 6/18/2021   WBC      3.90 - 12.70 K/uL 7.00   RBC      4.00 - 5.40 M/uL 4.49   Hemoglobin      12.0 - 16.0 g/dL 12.0   Hematocrit      37.0 - 48.5 % 38.0   MCV      82 - 98 fL 85   MCH      27.0 - 31.0 pg 26.7 (L)   MCHC      32.0 - 36.0 g/dL 31.6 (L)   RDW      11.5 - 14.5 % 13.1   Platelets      150 - 450 K/uL 279   MPV      9.2 - 12.9 fL 10.8   Immature Granulocytes      0.0 - 0.5 % 0.3   Gran # (ANC)      1.8 - 7.7 K/uL 4.5   Immature Grans (Abs)      0.00 - 0.04 K/uL 0.02   Lymph #      1.0 - 4.8 K/uL 1.5   Mono #      0.3 - 1.0 K/uL 0.7   Eos #      0.0 - 0.5 K/uL 0.2   Baso #      0.00 - 0.20 K/uL 0.06   nRBC      0 /100 WBC 0   Gran %      38.0 - 73.0  % 64.5   Lymph %      18.0 - 48.0 % 21.7   Mono %      4.0 - 15.0 % 10.0   Eosinophil %      0.0 - 8.0 % 2.6   Basophil %      0.0 - 1.9 % 0.9   Differential Method       Automated   Sodium      136 - 145 mmol/L 141   Potassium      3.5 - 5.1 mmol/L 4.3   Chloride      95 - 110 mmol/L 106   CO2      23 - 29 mmol/L 24   Glucose      70 - 110 mg/dL 94   BUN      8 - 23 mg/dL 15   Creatinine      0.5 - 1.4 mg/dL 1.0   Calcium      8.7 - 10.5 mg/dL 9.8   PROTEIN TOTAL      6.0 - 8.4 g/dL 7.1   Albumin      3.5 - 5.2 g/dL 4.0   BILIRUBIN TOTAL      0.1 - 1.0 mg/dL 0.3   Alkaline Phosphatase      55 - 135 U/L 93   AST      10 - 40 U/L 34   ALT      10 - 44 U/L 26   Anion Gap      8 - 16 mmol/L 11   eGFR if African American      >60 mL/min/1.73 m:2 >60.0   eGFR if non African American      >60 mL/min/1.73 m:2 57.2 (A)   Sed Rate      0 - 20 mm/Hr 11   CRP      0.0 - 8.2 mg/L 0.5   Vit D, 25-Hydroxy      30 - 96 ng/mL 53     Assessment:       1. PSA (psoriatic arthritis)    2. Psoriasis with pustules    3. NCGS (non-celiac gluten sensitivity)    4. Immunocompromised state due to drug therapy            Plan:       PSA (psoriatic arthritis)  -     CBC Auto Differential; Future; Expected date: 01/18/2022  -     Comprehensive Metabolic Panel; Future; Expected date: 01/18/2022  -     C-Reactive Protein; Future; Expected date: 01/18/2022  -     Sedimentation rate; Future; Expected date: 01/18/2022    Psoriasis with pustules    NCGS (non-celiac gluten sensitivity)    Immunocompromised state due to drug therapy        Assessment:  72 year old female with  Psoriatic arthritis, psoriasis, hx of MICHAEL positive, histone antibody positive  --hx of XANDER s/p infusion  --NCGS  --CKD    Plan:  1. Cont. mobic 7.5 mg prn.  2. Cont. Topical steroids prn  3. Avoid gluten  4. Follow up on taltz approval. May consider IV orencia or simponi aria  5. Check labs soon  6. Covid vaccine discussed, pt is not interested in receiving this.    Follow up:  4  ciera Cantu

## 2022-01-20 NOTE — TELEPHONE ENCOUNTER
Pt would like to proceed with  assistance program application for Taltz. Emailed patient assistance application to [email or web address] and faxed provider portion to MD.

## 2022-01-26 NOTE — TELEPHONE ENCOUNTER
Patient portion received. Key @ [facility] confirmed provider portion received and is on Dr. Cantu's desk awaiting signature.

## 2022-02-04 NOTE — TELEPHONE ENCOUNTER
Called to check status of PAP application on 01/31- rep said it would be 7 days before it would be processed and they running 2 weeks behind on approvals. Rep said to call back on 02/4. Called again today, 2/4-rep said they did received the application and it is still pending review. They are still running behind on reviews.Will call back next week to check status.

## 2022-02-07 NOTE — TELEPHONE ENCOUNTER
Received Approved PAP application from [facility] for Penny. Made an outbound call to the pt to inform of PAP approval but no answer, LVM. The letter states that the [facility] ([phone]). The rx was on the PAP application.

## 2022-02-17 ENCOUNTER — TELEPHONE (OUTPATIENT)
Dept: RHEUMATOLOGY | Facility: CLINIC | Age: 72
End: 2022-02-17
Payer: MEDICARE

## 2022-02-17 DIAGNOSIS — E03.9 HYPOTHYROIDISM, UNSPECIFIED TYPE: Primary | ICD-10-CM

## 2022-02-17 RX ORDER — LEVOTHYROXINE SODIUM 75 UG/1
75 TABLET ORAL
Qty: 30 TABLET | Refills: 3 | Status: SHIPPED | OUTPATIENT
Start: 2022-02-17

## 2022-02-17 NOTE — TELEPHONE ENCOUNTER
Called pharmacy to find out if Synthroid script was filled but pharmacy is closed for a meal break

## 2022-02-17 NOTE — TELEPHONE ENCOUNTER
Spoke to Pharmacist patient is out of Synthroid medication as for 3 days ago and is needing another prescription

## 2022-02-17 NOTE — TELEPHONE ENCOUNTER
I sent thyroid, but it does not appear we have ever prescribed this for her before.   Same as Pre-Op Diagnosis

## 2022-02-17 NOTE — TELEPHONE ENCOUNTER
----- Message from Kristine Pastor sent at 2/17/2022  9:24 AM CST -----  Regarding: refill  Contact: patient  Type:  RX Refill Request    Who Called:  patient  Refill or New Rx:  new  RX Name and Strength:  SYNTHROID 75 mcg tablet  How is the patient currently taking it? (ex. 1XDay):  1xday  Is this a 30 day or 90 day RX:  90  Preferred Pharmacy with phone number:    [id number] - [place], [place] - [address] [place] [address] AT SEC OF [address] [facility]  [address] [facility] [address]  Phone: [phone] Fax: [phone]  Local or Mail Order:  local  Ordering Provider:  Dr Pepe Clark Call Back Number:  [phone] (home)   Additional Information:  Patient would like a 90 day supply. She has been out of it for 3 days. Please call patient if any questions. Thanks!

## 2022-02-22 ENCOUNTER — LAB VISIT (OUTPATIENT)
Dept: LAB | Facility: HOSPITAL | Age: 72
End: 2022-02-22
Attending: PHYSICIAN ASSISTANT
Payer: MEDICARE

## 2022-02-22 DIAGNOSIS — L40.50 PSA (PSORIATIC ARTHRITIS): ICD-10-CM

## 2022-02-22 LAB
ALBUMIN SERPL BCP-MCNC: 3.9 G/DL (ref 3.5–5.2)
ALP SERPL-CCNC: 104 U/L (ref 55–135)
ALT SERPL W/O P-5'-P-CCNC: 13 U/L (ref 10–44)
ANION GAP SERPL CALC-SCNC: 8 MMOL/L (ref 8–16)
AST SERPL-CCNC: 20 U/L (ref 10–40)
BASOPHILS # BLD AUTO: 0.06 K/UL (ref 0–0.2)
BASOPHILS NFR BLD: 0.8 % (ref 0–1.9)
BILIRUB SERPL-MCNC: 0.4 MG/DL (ref 0.1–1)
BUN SERPL-MCNC: 14 MG/DL (ref 8–23)
CALCIUM SERPL-MCNC: 10 MG/DL (ref 8.7–10.5)
CHLORIDE SERPL-SCNC: 103 MMOL/L (ref 95–110)
CO2 SERPL-SCNC: 27 MMOL/L (ref 23–29)
CREAT SERPL-MCNC: 1 MG/DL (ref 0.5–1.4)
CRP SERPL-MCNC: 0.4 MG/L (ref 0–8.2)
DIFFERENTIAL METHOD: ABNORMAL
EOSINOPHIL # BLD AUTO: 0.3 K/UL (ref 0–0.5)
EOSINOPHIL NFR BLD: 4 % (ref 0–8)
ERYTHROCYTE [DISTWIDTH] IN BLOOD BY AUTOMATED COUNT: 14.3 % (ref 11.5–14.5)
ERYTHROCYTE [SEDIMENTATION RATE] IN BLOOD BY WESTERGREN METHOD: 36 MM/HR (ref 0–36)
EST. GFR  (AFRICAN AMERICAN): >60 ML/MIN/1.73 M^2
EST. GFR  (NON AFRICAN AMERICAN): 56.8 ML/MIN/1.73 M^2
GLUCOSE SERPL-MCNC: 93 MG/DL (ref 70–110)
HCT VFR BLD AUTO: 40 % (ref 37–48.5)
HGB BLD-MCNC: 12.3 G/DL (ref 12–16)
IMM GRANULOCYTES # BLD AUTO: 0.01 K/UL (ref 0–0.04)
IMM GRANULOCYTES NFR BLD AUTO: 0.1 % (ref 0–0.5)
LYMPHOCYTES # BLD AUTO: 1.8 K/UL (ref 1–4.8)
LYMPHOCYTES NFR BLD: 24 % (ref 18–48)
MCH RBC QN AUTO: 25.5 PG (ref 27–31)
MCHC RBC AUTO-ENTMCNC: 30.8 G/DL (ref 32–36)
MCV RBC AUTO: 83 FL (ref 82–98)
MONOCYTES # BLD AUTO: 0.8 K/UL (ref 0.3–1)
MONOCYTES NFR BLD: 11.2 % (ref 4–15)
NEUTROPHILS # BLD AUTO: 4.5 K/UL (ref 1.8–7.7)
NEUTROPHILS NFR BLD: 59.9 % (ref 38–73)
NRBC BLD-RTO: 0 /100 WBC
PLATELET # BLD AUTO: 274 K/UL (ref 150–450)
PMV BLD AUTO: 11.2 FL (ref 9.2–12.9)
POTASSIUM SERPL-SCNC: 4.3 MMOL/L (ref 3.5–5.1)
PROT SERPL-MCNC: 7.2 G/DL (ref 6–8.4)
RBC # BLD AUTO: 4.83 M/UL (ref 4–5.4)
SODIUM SERPL-SCNC: 138 MMOL/L (ref 136–145)
WBC # BLD AUTO: 7.49 K/UL (ref 3.9–12.7)

## 2022-02-22 PROCEDURE — 85025 COMPLETE CBC W/AUTO DIFF WBC: CPT | Performed by: PHYSICIAN ASSISTANT

## 2022-02-22 PROCEDURE — 85652 RBC SED RATE AUTOMATED: CPT | Performed by: PHYSICIAN ASSISTANT

## 2022-02-22 PROCEDURE — 80053 COMPREHEN METABOLIC PANEL: CPT | Performed by: PHYSICIAN ASSISTANT

## 2022-02-22 PROCEDURE — 86140 C-REACTIVE PROTEIN: CPT | Performed by: PHYSICIAN ASSISTANT

## 2022-02-22 PROCEDURE — 36415 COLL VENOUS BLD VENIPUNCTURE: CPT | Mod: PO | Performed by: PHYSICIAN ASSISTANT

## 2022-04-04 ENCOUNTER — OFFICE VISIT (OUTPATIENT)
Dept: RHEUMATOLOGY | Facility: CLINIC | Age: 72
End: 2022-04-04
Payer: MEDICARE

## 2022-04-04 ENCOUNTER — DOCUMENTATION ONLY (OUTPATIENT)
Dept: PHARMACY | Facility: CLINIC | Age: 72
End: 2022-04-04

## 2022-04-04 ENCOUNTER — DOCUMENTATION ONLY (OUTPATIENT)
Dept: PHARMACY | Facility: CLINIC | Age: 72
End: 2022-04-04
Payer: MEDICARE

## 2022-04-04 VITALS
WEIGHT: 158.75 LBS | SYSTOLIC BLOOD PRESSURE: 119 MMHG | HEART RATE: 74 BPM | HEIGHT: 61 IN | BODY MASS INDEX: 29.97 KG/M2 | DIASTOLIC BLOOD PRESSURE: 73 MMHG

## 2022-04-04 DIAGNOSIS — G89.4 CHRONIC PAIN SYNDROME: Primary | ICD-10-CM

## 2022-04-04 DIAGNOSIS — L40.8 PSORIASIS WITH PUSTULES: ICD-10-CM

## 2022-04-04 DIAGNOSIS — M54.31 SCIATIC PAIN, RIGHT: ICD-10-CM

## 2022-04-04 DIAGNOSIS — L40.50 PSA (PSORIATIC ARTHRITIS): ICD-10-CM

## 2022-04-04 PROCEDURE — 99214 OFFICE O/P EST MOD 30 MIN: CPT | Mod: S$PBB,,, | Performed by: INTERNAL MEDICINE

## 2022-04-04 PROCEDURE — 99999 PR PBB SHADOW E&M-EST. PATIENT-LVL IV: CPT | Mod: PBBFAC,,, | Performed by: INTERNAL MEDICINE

## 2022-04-04 PROCEDURE — 99999 PR PBB SHADOW E&M-EST. PATIENT-LVL IV: ICD-10-PCS | Mod: PBBFAC,,, | Performed by: INTERNAL MEDICINE

## 2022-04-04 PROCEDURE — 99214 OFFICE O/P EST MOD 30 MIN: CPT | Mod: PBBFAC,PN | Performed by: INTERNAL MEDICINE

## 2022-04-04 PROCEDURE — 99214 PR OFFICE/OUTPT VISIT, EST, LEVL IV, 30-39 MIN: ICD-10-PCS | Mod: S$PBB,,, | Performed by: INTERNAL MEDICINE

## 2022-04-04 RX ORDER — BETAMETHASONE VALERATE 1.2 MG/G
OINTMENT TOPICAL 2 TIMES DAILY
Qty: 45 G | Refills: 3 | Status: SHIPPED | OUTPATIENT
Start: 2022-04-04

## 2022-04-04 RX ORDER — TRAMADOL HYDROCHLORIDE 50 MG/1
50 TABLET ORAL EVERY 8 HOURS PRN
Qty: 90 TABLET | Refills: 3 | Status: SHIPPED | OUTPATIENT
Start: 2022-04-04 | End: 2022-09-11

## 2022-04-04 RX ORDER — MELOXICAM 7.5 MG/1
7.5 TABLET ORAL DAILY
Qty: 90 TABLET | Refills: 3 | Status: SHIPPED | OUTPATIENT
Start: 2022-04-04 | End: 2023-06-14

## 2022-04-04 RX ORDER — LIDOCAINE AND PRILOCAINE 25; 25 MG/G; MG/G
CREAM TOPICAL
Qty: 30 G | Refills: 3 | Status: SHIPPED | OUTPATIENT
Start: 2022-04-04

## 2022-04-04 NOTE — PROGRESS NOTES
Subjective:       Patient ID: Letha Enciso is a 72 y.o. female.    Chief Complaint: Disease Management    Follow up: 72 year old female  psoriatic arthritis and psoriasis. She has he noticed an improvement in her skin and pain level on treatment, but rash has returned on her R hand taltz is being covered. She has stiffness and pain in her hands, but this is mild overall. She is still avoiding gluten. She has new pain in the R thigh that occurs when moving from seated to standing and then improves and also occurs at night. No low back pain or lower ext weakness. No lateral hip pain. No injury.     She had covid infection 2 weeks ago with mild symptoms. She has covid last year as well and received MAB infusion at that time.       Current tx:  1. mobic 7.5 mg  2. taltz             Associated symptoms include fatigue. Pertinent negatives include no fever or headaches.         Review of Systems   Constitutional: Positive for activity change and fatigue. Negative for appetite change, chills and fever.   Eyes: Negative for visual disturbance.   Respiratory: Negative for cough and shortness of breath.    Cardiovascular: Negative for chest pain, palpitations and leg swelling.   Gastrointestinal: Negative for abdominal pain, constipation, diarrhea, nausea and vomiting.   Musculoskeletal: Positive for arthralgias.   Skin: Positive for rash.   Neurological: Negative for dizziness, weakness, light-headedness and headaches.         Objective:     Vitals:    04/04/22 1434   BP: 119/73   Pulse: 74       Past Medical History:   Diagnosis Date    Anemia      No past surgical history on file.       Physical Exam   Eyes: Right conjunctiva is not injected. Left conjunctiva is not injected. Right eye exhibits normal extraocular motion. Left eye exhibits normal extraocular motion.   Neck: No JVD present. No thyromegaly present.   Cardiovascular: Normal rate. Exam reveals no decreased pulses.   Pulmonary/Chest: Effort normal.    Musculoskeletal:      Right shoulder: Normal.      Left shoulder: Normal.      Right elbow: Normal.      Left elbow: Normal.      Right wrist: Normal.      Left wrist: Normal.      Right knee: Normal.      Left knee: Normal.   Lymphadenopathy:     She has no cervical adenopathy.   Neurological: Gait normal.   Skin: Rash (psoriasis on the palms (r hand >L)) noted.   Psychiatric: Mood and affect normal.       Right Side Rheumatological Exam     Examination finds the shoulder, elbow, wrist, knee, 1st PIP, 1st MCP, 2nd PIP, 2nd MCP, 3rd PIP, 3rd MCP, 4th PIP, 4th MCP, 5th PIP and 5th MCP normal.    The patient is tender to palpation of the 2nd DIP    The patient has an enlarged 2nd DIP, 3rd DIP, 4th DIP and 5th DIP    Left Side Rheumatological Exam     Examination finds the shoulder, elbow, wrist, knee, 1st PIP, 1st MCP, 2nd PIP, 2nd MCP, 3rd PIP, 3rd MCP, 4th PIP, 4th MCP, 5th PIP and 5th MCP normal.    The patient has an enlarged 2nd DIP, 3rd DIP, 4th DIP and 5th DIP.          Results for orders placed or performed in visit on 02/22/22   CBC Auto Differential   Result Value Ref Range    WBC 7.49 3.90 - 12.70 K/uL    RBC 4.83 4.00 - 5.40 M/uL    Hemoglobin 12.3 12.0 - 16.0 g/dL    Hematocrit 40.0 37.0 - 48.5 %    MCV 83 82 - 98 fL    MCH 25.5 (L) 27.0 - 31.0 pg    MCHC 30.8 (L) 32.0 - 36.0 g/dL    RDW 14.3 11.5 - 14.5 %    Platelets 274 150 - 450 K/uL    MPV 11.2 9.2 - 12.9 fL    Immature Granulocytes 0.1 0.0 - 0.5 %    Gran # (ANC) 4.5 1.8 - 7.7 K/uL    Immature Grans (Abs) 0.01 0.00 - 0.04 K/uL    Lymph # 1.8 1.0 - 4.8 K/uL    Mono # 0.8 0.3 - 1.0 K/uL    Eos # 0.3 0.0 - 0.5 K/uL    Baso # 0.06 0.00 - 0.20 K/uL    nRBC 0 0 /100 WBC    Gran % 59.9 38.0 - 73.0 %    Lymph % 24.0 18.0 - 48.0 %    Mono % 11.2 4.0 - 15.0 %    Eosinophil % 4.0 0.0 - 8.0 %    Basophil % 0.8 0.0 - 1.9 %    Differential Method Automated    Comprehensive Metabolic Panel   Result Value Ref Range    Sodium 138 136 - 145 mmol/L    Potassium 4.3  3.5 - 5.1 mmol/L    Chloride 103 95 - 110 mmol/L    CO2 27 23 - 29 mmol/L    Glucose 93 70 - 110 mg/dL    BUN 14 8 - 23 mg/dL    Creatinine 1.0 0.5 - 1.4 mg/dL    Calcium 10.0 8.7 - 10.5 mg/dL    Total Protein 7.2 6.0 - 8.4 g/dL    Albumin 3.9 3.5 - 5.2 g/dL    Total Bilirubin 0.4 0.1 - 1.0 mg/dL    Alkaline Phosphatase 104 55 - 135 U/L    AST 20 10 - 40 U/L    ALT 13 10 - 44 U/L    Anion Gap 8 8 - 16 mmol/L    eGFR if African American >60.0 >60 mL/min/1.73 m^2    eGFR if non  56.8 (A) >60 mL/min/1.73 m^2   C-Reactive Protein   Result Value Ref Range    CRP 0.4 0.0 - 8.2 mg/L   Sedimentation rate   Result Value Ref Range    Sed Rate 36 0 - 36 mm/Hr         Assessment:       1. Chronic pain syndrome    2. PSA (psoriatic arthritis)    3. Psoriasis with pustules    4. Sciatic pain, right            Plan:       Chronic pain syndrome  -     X-Ray Hip 2 or 3 views Right (with Pelvis when performed); Future; Expected date: 04/04/2022  -     X-Ray Femur 2 View Right; Future; Expected date: 04/04/2022  -     meloxicam (MOBIC) 7.5 MG tablet; Take 1 tablet (7.5 mg total) by mouth once daily.  Dispense: 90 tablet; Refill: 3  -     traMADoL (ULTRAM) 50 mg tablet; Take 1 tablet (50 mg total) by mouth every 8 (eight) hours as needed for Pain.  Dispense: 90 tablet; Refill: 3  -     LIDOcaine-prilocaine (EMLA) cream; Apply topically as needed (pain).  Dispense: 30 g; Refill: 3  -     betamethasone valerate 0.1% (VALISONE) 0.1 % Oint; Apply topically 2 (two) times daily.  Dispense: 45 g; Refill: 3  -     CBC Auto Differential; Future; Expected date: 04/04/2022  -     Comprehensive Metabolic Panel; Future; Expected date: 04/04/2022  -     Sedimentation rate; Future; Expected date: 04/04/2022  -     C-Reactive Protein; Future; Expected date: 04/04/2022    PSA (psoriatic arthritis)  -     X-Ray Hip 2 or 3 views Right (with Pelvis when performed); Future; Expected date: 04/04/2022  -     X-Ray Femur 2 View Right; Future;  Expected date: 04/04/2022  -     meloxicam (MOBIC) 7.5 MG tablet; Take 1 tablet (7.5 mg total) by mouth once daily.  Dispense: 90 tablet; Refill: 3  -     traMADoL (ULTRAM) 50 mg tablet; Take 1 tablet (50 mg total) by mouth every 8 (eight) hours as needed for Pain.  Dispense: 90 tablet; Refill: 3  -     LIDOcaine-prilocaine (EMLA) cream; Apply topically as needed (pain).  Dispense: 30 g; Refill: 3  -     betamethasone valerate 0.1% (VALISONE) 0.1 % Oint; Apply topically 2 (two) times daily.  Dispense: 45 g; Refill: 3  -     CBC Auto Differential; Future; Expected date: 04/04/2022  -     Comprehensive Metabolic Panel; Future; Expected date: 04/04/2022  -     Sedimentation rate; Future; Expected date: 04/04/2022  -     C-Reactive Protein; Future; Expected date: 04/04/2022    Psoriasis with pustules  -     X-Ray Hip 2 or 3 views Right (with Pelvis when performed); Future; Expected date: 04/04/2022  -     X-Ray Femur 2 View Right; Future; Expected date: 04/04/2022  -     meloxicam (MOBIC) 7.5 MG tablet; Take 1 tablet (7.5 mg total) by mouth once daily.  Dispense: 90 tablet; Refill: 3  -     traMADoL (ULTRAM) 50 mg tablet; Take 1 tablet (50 mg total) by mouth every 8 (eight) hours as needed for Pain.  Dispense: 90 tablet; Refill: 3  -     LIDOcaine-prilocaine (EMLA) cream; Apply topically as needed (pain).  Dispense: 30 g; Refill: 3  -     betamethasone valerate 0.1% (VALISONE) 0.1 % Oint; Apply topically 2 (two) times daily.  Dispense: 45 g; Refill: 3  -     CBC Auto Differential; Future; Expected date: 04/04/2022  -     Comprehensive Metabolic Panel; Future; Expected date: 04/04/2022  -     Sedimentation rate; Future; Expected date: 04/04/2022  -     C-Reactive Protein; Future; Expected date: 04/04/2022    Sciatic pain, right  -     X-Ray Hip 2 or 3 views Right (with Pelvis when performed); Future; Expected date: 04/04/2022  -     X-Ray Femur 2 View Right; Future; Expected date: 04/04/2022  -     meloxicam (MOBIC) 7.5  MG tablet; Take 1 tablet (7.5 mg total) by mouth once daily.  Dispense: 90 tablet; Refill: 3  -     traMADoL (ULTRAM) 50 mg tablet; Take 1 tablet (50 mg total) by mouth every 8 (eight) hours as needed for Pain.  Dispense: 90 tablet; Refill: 3  -     LIDOcaine-prilocaine (EMLA) cream; Apply topically as needed (pain).  Dispense: 30 g; Refill: 3  -     betamethasone valerate 0.1% (VALISONE) 0.1 % Oint; Apply topically 2 (two) times daily.  Dispense: 45 g; Refill: 3  -     CBC Auto Differential; Future; Expected date: 04/04/2022  -     Comprehensive Metabolic Panel; Future; Expected date: 04/04/2022  -     Sedimentation rate; Future; Expected date: 04/04/2022  -     C-Reactive Protein; Future; Expected date: 04/04/2022        Assessment:  72 year old female with  Psoriatic arthritis, psoriasis, hx of MICHAEL positive, histone antibody positive  --hx of XANDER s/p infusion  --NCGS  --CKD    1. Xray once she is in to for sewing  2.  Continue taltz  3.  Add tramadol prn  4.  Take low dose prednisone prn  Labs when in town

## 2022-04-04 NOTE — PATIENT INSTRUCTIONS
Xray once she is in to for sewing   Continue christine   Add tramadol prn pm   Take low dose prednisone prn in am for swelling and stiffness  Labs when in town

## 2022-04-04 NOTE — PROGRESS NOTES
PA submitted for LIDOCAINE-PRILOCAINE CREAM  Key:  [id number]      RONALDO GUILLEN,CP  MED ACCESS  4/4/2022

## 2022-04-05 ENCOUNTER — DOCUMENTATION ONLY (OUTPATIENT)
Dept: PHARMACY | Facility: CLINIC | Age: 72
End: 2022-04-05
Payer: MEDICARE

## 2022-04-05 ASSESSMENT — ROUTINE ASSESSMENT OF PATIENT INDEX DATA (RAPID3)
MDHAQ FUNCTION SCORE: 0.2
PATIENT GLOBAL ASSESSMENT SCORE: 2.5
TOTAL RAPID3 SCORE: 1.39
PSYCHOLOGICAL DISTRESS SCORE: 0
PAIN SCORE: 1
FATIGUE SCORE: 0

## 2022-04-05 NOTE — PROGRESS NOTES
PA FOR LIDOCAINE- PRILOCAINE 2.5-2.5% APPROVED    RX NOTIFIED    RONALDO GUILLEN CPHT  MED ACCESS   4/4/22

## 2022-07-19 ENCOUNTER — OFFICE VISIT (OUTPATIENT)
Dept: RHEUMATOLOGY | Facility: CLINIC | Age: 72
End: 2022-07-19
Payer: MEDICARE

## 2022-07-19 VITALS
SYSTOLIC BLOOD PRESSURE: 146 MMHG | HEIGHT: 60 IN | DIASTOLIC BLOOD PRESSURE: 89 MMHG | WEIGHT: 158 LBS | BODY MASS INDEX: 31.02 KG/M2 | HEART RATE: 65 BPM

## 2022-07-19 DIAGNOSIS — L40.8 PSORIASIS WITH PUSTULES: ICD-10-CM

## 2022-07-19 DIAGNOSIS — G89.4 CHRONIC PAIN SYNDROME: ICD-10-CM

## 2022-07-19 DIAGNOSIS — K90.41 NCGS (NON-CELIAC GLUTEN SENSITIVITY): ICD-10-CM

## 2022-07-19 DIAGNOSIS — L40.50 PSA (PSORIATIC ARTHRITIS): Primary | ICD-10-CM

## 2022-07-19 PROCEDURE — 99999 PR PBB SHADOW E&M-EST. PATIENT-LVL IV: ICD-10-PCS | Mod: PBBFAC,,, | Performed by: PHYSICIAN ASSISTANT

## 2022-07-19 PROCEDURE — 99213 PR OFFICE/OUTPT VISIT, EST, LEVL III, 20-29 MIN: ICD-10-PCS | Mod: S$PBB,,, | Performed by: PHYSICIAN ASSISTANT

## 2022-07-19 PROCEDURE — 99214 OFFICE O/P EST MOD 30 MIN: CPT | Mod: PBBFAC,PN | Performed by: PHYSICIAN ASSISTANT

## 2022-07-19 PROCEDURE — 99213 OFFICE O/P EST LOW 20 MIN: CPT | Mod: S$PBB,,, | Performed by: PHYSICIAN ASSISTANT

## 2022-07-19 PROCEDURE — 99999 PR PBB SHADOW E&M-EST. PATIENT-LVL IV: CPT | Mod: PBBFAC,,, | Performed by: PHYSICIAN ASSISTANT

## 2022-07-19 ASSESSMENT — ROUTINE ASSESSMENT OF PATIENT INDEX DATA (RAPID3)
MDHAQ FUNCTION SCORE: 0.2
FATIGUE SCORE: 0
PSYCHOLOGICAL DISTRESS SCORE: 0
TOTAL RAPID3 SCORE: 1.22
PAIN SCORE: 1.5
PATIENT GLOBAL ASSESSMENT SCORE: 1.5

## 2022-10-06 ENCOUNTER — OFFICE VISIT (OUTPATIENT)
Dept: RHEUMATOLOGY | Facility: CLINIC | Age: 72
End: 2022-10-06
Payer: MEDICARE

## 2022-10-06 ENCOUNTER — LAB VISIT (OUTPATIENT)
Dept: LAB | Facility: HOSPITAL | Age: 72
End: 2022-10-06
Attending: INTERNAL MEDICINE
Payer: MEDICARE

## 2022-10-06 VITALS
SYSTOLIC BLOOD PRESSURE: 147 MMHG | WEIGHT: 160.94 LBS | HEART RATE: 64 BPM | HEIGHT: 60 IN | BODY MASS INDEX: 31.6 KG/M2 | DIASTOLIC BLOOD PRESSURE: 76 MMHG

## 2022-10-06 DIAGNOSIS — G89.4 CHRONIC PAIN SYNDROME: ICD-10-CM

## 2022-10-06 DIAGNOSIS — L40.50 PSA (PSORIATIC ARTHRITIS): Primary | ICD-10-CM

## 2022-10-06 DIAGNOSIS — K90.41 NCGS (NON-CELIAC GLUTEN SENSITIVITY): ICD-10-CM

## 2022-10-06 DIAGNOSIS — L40.8 PSORIASIS WITH PUSTULES: ICD-10-CM

## 2022-10-06 DIAGNOSIS — D84.821 IMMUNOCOMPROMISED STATE DUE TO DRUG THERAPY: ICD-10-CM

## 2022-10-06 DIAGNOSIS — L13.0 DERMATITIS HERPETIFORMIS: ICD-10-CM

## 2022-10-06 DIAGNOSIS — M62.830 SPASM OF BACK MUSCLES: ICD-10-CM

## 2022-10-06 DIAGNOSIS — D84.9 IMMUNE DEFICIENCY DISORDER: ICD-10-CM

## 2022-10-06 DIAGNOSIS — M54.31 SCIATIC PAIN, RIGHT: ICD-10-CM

## 2022-10-06 DIAGNOSIS — Z79.899 IMMUNOCOMPROMISED STATE DUE TO DRUG THERAPY: ICD-10-CM

## 2022-10-06 DIAGNOSIS — L40.50 PSA (PSORIATIC ARTHRITIS): ICD-10-CM

## 2022-10-06 LAB
ALBUMIN SERPL BCP-MCNC: 3.7 G/DL (ref 3.5–5.2)
ALP SERPL-CCNC: 100 U/L (ref 55–135)
ALT SERPL W/O P-5'-P-CCNC: 15 U/L (ref 10–44)
ANION GAP SERPL CALC-SCNC: 10 MMOL/L (ref 8–16)
AST SERPL-CCNC: 21 U/L (ref 10–40)
BASOPHILS # BLD AUTO: 0.04 K/UL (ref 0–0.2)
BASOPHILS NFR BLD: 0.6 % (ref 0–1.9)
BILIRUB SERPL-MCNC: 0.3 MG/DL (ref 0.1–1)
BUN SERPL-MCNC: 11 MG/DL (ref 8–23)
CALCIUM SERPL-MCNC: 9.5 MG/DL (ref 8.7–10.5)
CHLORIDE SERPL-SCNC: 110 MMOL/L (ref 95–110)
CO2 SERPL-SCNC: 22 MMOL/L (ref 23–29)
CREAT SERPL-MCNC: 0.9 MG/DL (ref 0.5–1.4)
CRP SERPL-MCNC: 1.3 MG/L (ref 0–8.2)
DIFFERENTIAL METHOD: ABNORMAL
EOSINOPHIL # BLD AUTO: 0.3 K/UL (ref 0–0.5)
EOSINOPHIL NFR BLD: 4.4 % (ref 0–8)
ERYTHROCYTE [DISTWIDTH] IN BLOOD BY AUTOMATED COUNT: 13.9 % (ref 11.5–14.5)
ERYTHROCYTE [SEDIMENTATION RATE] IN BLOOD BY PHOTOMETRIC METHOD: 22 MM/HR (ref 0–36)
EST. GFR  (NO RACE VARIABLE): >60 ML/MIN/1.73 M^2
GLUCOSE SERPL-MCNC: 111 MG/DL (ref 70–110)
HCT VFR BLD AUTO: 39.2 % (ref 37–48.5)
HGB BLD-MCNC: 11.8 G/DL (ref 12–16)
IMM GRANULOCYTES # BLD AUTO: 0.01 K/UL (ref 0–0.04)
IMM GRANULOCYTES NFR BLD AUTO: 0.1 % (ref 0–0.5)
LYMPHOCYTES # BLD AUTO: 1.6 K/UL (ref 1–4.8)
LYMPHOCYTES NFR BLD: 22.8 % (ref 18–48)
MCH RBC QN AUTO: 26.1 PG (ref 27–31)
MCHC RBC AUTO-ENTMCNC: 30.1 G/DL (ref 32–36)
MCV RBC AUTO: 87 FL (ref 82–98)
MONOCYTES # BLD AUTO: 0.7 K/UL (ref 0.3–1)
MONOCYTES NFR BLD: 10.3 % (ref 4–15)
NEUTROPHILS # BLD AUTO: 4.2 K/UL (ref 1.8–7.7)
NEUTROPHILS NFR BLD: 61.8 % (ref 38–73)
NRBC BLD-RTO: 0 /100 WBC
PLATELET # BLD AUTO: 259 K/UL (ref 150–450)
PMV BLD AUTO: 12 FL (ref 9.2–12.9)
POTASSIUM SERPL-SCNC: 3.8 MMOL/L (ref 3.5–5.1)
PROT SERPL-MCNC: 6.7 G/DL (ref 6–8.4)
RBC # BLD AUTO: 4.52 M/UL (ref 4–5.4)
SARS-COV-2 IGG SERPL IA-ACNC: 1001.8 AU/ML
SARS-COV-2 IGG SERPL QL IA: POSITIVE
SODIUM SERPL-SCNC: 142 MMOL/L (ref 136–145)
WBC # BLD AUTO: 6.81 K/UL (ref 3.9–12.7)

## 2022-10-06 PROCEDURE — 36415 COLL VENOUS BLD VENIPUNCTURE: CPT | Mod: PO | Performed by: INTERNAL MEDICINE

## 2022-10-06 PROCEDURE — 99999 PR PBB SHADOW E&M-EST. PATIENT-LVL III: ICD-10-PCS | Mod: PBBFAC,,, | Performed by: INTERNAL MEDICINE

## 2022-10-06 PROCEDURE — 86140 C-REACTIVE PROTEIN: CPT | Performed by: INTERNAL MEDICINE

## 2022-10-06 PROCEDURE — 99213 OFFICE O/P EST LOW 20 MIN: CPT | Mod: PBBFAC,PN | Performed by: INTERNAL MEDICINE

## 2022-10-06 PROCEDURE — 85652 RBC SED RATE AUTOMATED: CPT | Performed by: INTERNAL MEDICINE

## 2022-10-06 PROCEDURE — 99215 PR OFFICE/OUTPT VISIT, EST, LEVL V, 40-54 MIN: ICD-10-PCS | Mod: 25,S$PBB,, | Performed by: INTERNAL MEDICINE

## 2022-10-06 PROCEDURE — 96372 THER/PROPH/DIAG INJ SC/IM: CPT | Mod: PBBFAC,PN

## 2022-10-06 PROCEDURE — 80053 COMPREHEN METABOLIC PANEL: CPT | Performed by: INTERNAL MEDICINE

## 2022-10-06 PROCEDURE — 99215 OFFICE O/P EST HI 40 MIN: CPT | Mod: 25,S$PBB,, | Performed by: INTERNAL MEDICINE

## 2022-10-06 PROCEDURE — 85025 COMPLETE CBC W/AUTO DIFF WBC: CPT | Performed by: INTERNAL MEDICINE

## 2022-10-06 PROCEDURE — 86769 SARS-COV-2 COVID-19 ANTIBODY: CPT | Performed by: INTERNAL MEDICINE

## 2022-10-06 PROCEDURE — 99999 PR PBB SHADOW E&M-EST. PATIENT-LVL III: CPT | Mod: PBBFAC,,, | Performed by: INTERNAL MEDICINE

## 2022-10-06 RX ORDER — RISANKIZUMAB-RZAA 150 MG/ML
1 INJECTION SUBCUTANEOUS
Qty: 1 ML | Refills: 1 | Status: SHIPPED | OUTPATIENT
Start: 2022-10-06 | End: 2023-04-11

## 2022-10-06 RX ORDER — CYANOCOBALAMIN 1000 UG/ML
1000 INJECTION, SOLUTION INTRAMUSCULAR; SUBCUTANEOUS
Status: COMPLETED | OUTPATIENT
Start: 2022-10-06 | End: 2022-10-06

## 2022-10-06 RX ORDER — TIZANIDINE 2 MG/1
2 TABLET ORAL EVERY 8 HOURS
Qty: 90 TABLET | Refills: 0 | Status: SHIPPED | OUTPATIENT
Start: 2022-10-06 | End: 2022-11-05

## 2022-10-06 RX ORDER — KETOROLAC TROMETHAMINE 30 MG/ML
60 INJECTION, SOLUTION INTRAMUSCULAR; INTRAVENOUS
Status: COMPLETED | OUTPATIENT
Start: 2022-10-06 | End: 2022-10-06

## 2022-10-06 RX ORDER — RISANKIZUMAB-RZAA 150 MG/ML
1 INJECTION SUBCUTANEOUS
Qty: 1 ML | Refills: 4 | Status: SHIPPED | OUTPATIENT
Start: 2022-10-06 | End: 2023-04-11

## 2022-10-06 RX ADMIN — CYANOCOBALAMIN 1000 MCG: 1000 INJECTION INTRAMUSCULAR; SUBCUTANEOUS at 04:10

## 2022-10-06 RX ADMIN — KETOROLAC TROMETHAMINE 60 MG: 60 INJECTION, SOLUTION INTRAMUSCULAR at 04:10

## 2022-10-06 ASSESSMENT — ROUTINE ASSESSMENT OF PATIENT INDEX DATA (RAPID3)
TOTAL RAPID3 SCORE: 0.89
PAIN SCORE: 1
PSYCHOLOGICAL DISTRESS SCORE: 0
MDHAQ FUNCTION SCORE: 0.2
PATIENT GLOBAL ASSESSMENT SCORE: 1
FATIGUE SCORE: 0

## 2022-10-06 NOTE — PROGRESS NOTES
Subjective:       Patient ID: Letha Enciso is a 72 y.o. female.    Chief Complaint: Disease Management    Follow up: 72 year old female  psoriatic arthritis and psoriasis. She is doing fair. She states hip/leg pain resolved so she did not proceed with x-rays as ordered. She has decided to stay off biologic therapy due to concerns for immune suppression. Overall, her joint pain is minimal on mobic 7.5 mg daily. She has not needed to take tramadol for severe pain. She has minimal stiffness in her hands. She has psoriasis on both palms. She has not been following gluten free diet and report epigastric pain and bloating. She changed her diet again and is eliminating gluten now.    We reviewed labs from 2/2022.    Current tx:  1. mobic 7.5 mg  2. taltz (on hold)    Review of Systems   Constitutional:  Positive for activity change and fatigue. Negative for appetite change and chills.   Eyes:  Negative for visual disturbance.   Respiratory:  Negative for cough and shortness of breath.    Cardiovascular:  Negative for chest pain, palpitations and leg swelling.   Gastrointestinal:  Negative for abdominal pain, constipation, diarrhea, nausea and vomiting.   Musculoskeletal:  Positive for arthralgias, gait problem, joint swelling, neck pain and neck stiffness.   Skin:  Positive for rash.   Neurological:  Negative for dizziness, weakness and light-headedness.       Objective:     Vitals:    10/06/22 1439   BP: (!) 147/76   Pulse: 64       Past Medical History:   Diagnosis Date    Anemia      No past surgical history on file.       Physical Exam   Constitutional: She is oriented to person, place, and time. No distress.   HENT:   Head: Normocephalic and atraumatic.   Eyes: Pupils are equal, round, and reactive to light. Right eye exhibits no discharge. Left eye exhibits no discharge. Right conjunctiva is not injected. Left conjunctiva is not injected.   Neck: No JVD present. No thyromegaly present.   Cardiovascular: Normal  rate, regular rhythm and normal heart sounds. Exam reveals no gallop, no friction rub and no decreased pulses.   No murmur heard.  Pulmonary/Chest: Effort normal and breath sounds normal. She has no wheezes. She has no rales. She exhibits no tenderness.   Abdominal: There is no abdominal tenderness. There is no rebound and no guarding.   Musculoskeletal:         General: Tenderness present.      Right shoulder: Normal.      Left shoulder: Normal.      Right elbow: Normal.      Left elbow: Normal.      Right wrist: Normal.      Left wrist: Normal.      Cervical back: Neck supple.      Right knee: Normal.      Left knee: Normal.   Lymphadenopathy:     She has no cervical adenopathy.   Neurological: She is alert and oriented to person, place, and time. Gait normal.   Skin: Skin is dry. Rash (psoriasis on the palms (r hand >L)) noted. No erythema. There is pallor.   Psychiatric: Mood and affect normal.   Vitals reviewed.      Right Side Rheumatological Exam     Examination finds the shoulder, elbow, wrist, knee, 1st PIP, 1st MCP, 2nd PIP, 2nd MCP, 3rd PIP, 3rd MCP, 4th PIP, 4th MCP, 5th PIP and 5th MCP normal.    The patient is tender to palpation of the 1st PIP, 1st MCP, 2nd PIP, 2nd MCP, 3rd PIP, 3rd MCP, 4th PIP, 4th MCP, 5th PIP, 5th MCP and 2nd DIP    She has swelling of the 1st PIP, 1st MCP, 2nd PIP, 2nd MCP, 3rd PIP, 3rd MCP, 4th PIP, 4th MCP, 5th PIP and 5th MCP    The patient has an enlarged 2nd DIP, 3rd DIP, 4th DIP and 5th DIP    Shoulder Exam   Tenderness Location: no tenderness    Range of Motion   Active abduction:  abnormal   Adduction: abnormal  Sensation: normal    Knee Exam   Patellofemoral Crepitus: positive  Sensation: normal    Hip Exam   Tenderness Location: posterior  Sensation: normal    Elbow/Wrist Exam   Tenderness Location: no tenderness  Sensation: normal    Left Side Rheumatological Exam     Examination finds the shoulder, elbow, wrist, knee, 1st PIP, 1st MCP, 2nd PIP, 2nd MCP, 3rd PIP, 3rd  MCP, 4th PIP, 4th MCP, 5th PIP and 5th MCP normal.    The patient is tender to palpation of the 1st PIP, 1st MCP, 2nd PIP, 2nd MCP, 3rd PIP, 3rd MCP, 4th PIP, 4th MCP, 5th PIP and 5th MCP.    She has swelling of the 1st PIP, 1st MCP, 2nd PIP, 2nd MCP, 3rd PIP, 3rd MCP, 4th PIP, 4th MCP, 5th PIP and 5th MCP    The patient has an enlarged 2nd DIP, 3rd DIP, 4th DIP and 5th DIP.    Shoulder Exam   Tenderness Location: no tenderness    Range of Motion   Active abduction:  abnormal   Sensation: normal    Knee Exam     Patellofemoral Crepitus: positive  Sensation: normal    Hip Exam   Tenderness Location: posterior  Sensation: normal    Elbow/Wrist Exam   Sensation: normal      Back/Neck Exam   General Inspection   Gait: normal       Results for orders placed or performed in visit on 02/22/22   CBC Auto Differential   Result Value Ref Range    WBC 7.49 3.90 - 12.70 K/uL    RBC 4.83 4.00 - 5.40 M/uL    Hemoglobin 12.3 12.0 - 16.0 g/dL    Hematocrit 40.0 37.0 - 48.5 %    MCV 83 82 - 98 fL    MCH 25.5 (L) 27.0 - 31.0 pg    MCHC 30.8 (L) 32.0 - 36.0 g/dL    RDW 14.3 11.5 - 14.5 %    Platelets 274 150 - 450 K/uL    MPV 11.2 9.2 - 12.9 fL    Immature Granulocytes 0.1 0.0 - 0.5 %    Gran # (ANC) 4.5 1.8 - 7.7 K/uL    Immature Grans (Abs) 0.01 0.00 - 0.04 K/uL    Lymph # 1.8 1.0 - 4.8 K/uL    Mono # 0.8 0.3 - 1.0 K/uL    Eos # 0.3 0.0 - 0.5 K/uL    Baso # 0.06 0.00 - 0.20 K/uL    nRBC 0 0 /100 WBC    Gran % 59.9 38.0 - 73.0 %    Lymph % 24.0 18.0 - 48.0 %    Mono % 11.2 4.0 - 15.0 %    Eosinophil % 4.0 0.0 - 8.0 %    Basophil % 0.8 0.0 - 1.9 %    Differential Method Automated    Comprehensive Metabolic Panel   Result Value Ref Range    Sodium 138 136 - 145 mmol/L    Potassium 4.3 3.5 - 5.1 mmol/L    Chloride 103 95 - 110 mmol/L    CO2 27 23 - 29 mmol/L    Glucose 93 70 - 110 mg/dL    BUN 14 8 - 23 mg/dL    Creatinine 1.0 0.5 - 1.4 mg/dL    Calcium 10.0 8.7 - 10.5 mg/dL    Total Protein 7.2 6.0 - 8.4 g/dL    Albumin 3.9 3.5 - 5.2  g/dL    Total Bilirubin 0.4 0.1 - 1.0 mg/dL    Alkaline Phosphatase 104 55 - 135 U/L    AST 20 10 - 40 U/L    ALT 13 10 - 44 U/L    Anion Gap 8 8 - 16 mmol/L    eGFR if African American >60.0 >60 mL/min/1.73 m^2    eGFR if non  56.8 (A) >60 mL/min/1.73 m^2   C-Reactive Protein   Result Value Ref Range    CRP 0.4 0.0 - 8.2 mg/L   Sedimentation rate   Result Value Ref Range    Sed Rate 36 0 - 36 mm/Hr       Assessment:       1. PSA (psoriatic arthritis)    2. NCGS (non-celiac gluten sensitivity)    3. Psoriasis with pustules    4. Chronic pain syndrome    5. Sciatic pain, right    6. Immunocompromised state due to drug therapy    7. Spasm of back muscles            Plan:       PSA (psoriatic arthritis)  -     risankizumab-rzaa (SKYRIZI) 150 mg/mL Syrg; Inject 1 each into the skin every 30 days.  Dispense: 1 mL; Refill: 1  -     risankizumab-rzaa (SKYRIZI) 150 mg/mL PnIj; Inject 1 each into the skin every 3 (three) months.  Dispense: 1 mL; Refill: 4  -     ketorolac injection 60 mg  -     cyanocobalamin injection 1,000 mcg  -     Comprehensive Metabolic Panel; Future; Expected date: 10/06/2022  -     CBC Auto Differential; Future; Expected date: 10/06/2022  -     Sedimentation rate; Future; Expected date: 10/06/2022  -     C-Reactive Protein; Future; Expected date: 10/06/2022    NCGS (non-celiac gluten sensitivity)  -     ketorolac injection 60 mg  -     cyanocobalamin injection 1,000 mcg  -     Comprehensive Metabolic Panel; Future; Expected date: 10/06/2022  -     CBC Auto Differential; Future; Expected date: 10/06/2022  -     Sedimentation rate; Future; Expected date: 10/06/2022  -     C-Reactive Protein; Future; Expected date: 10/06/2022    Psoriasis with pustules  -     risankizumab-rzaa (SKYRIZI) 150 mg/mL Syrg; Inject 1 each into the skin every 30 days.  Dispense: 1 mL; Refill: 1  -     risankizumab-rzaa (SKYRIZI) 150 mg/mL PnIj; Inject 1 each into the skin every 3 (three) months.  Dispense: 1  mL; Refill: 4  -     ketorolac injection 60 mg  -     cyanocobalamin injection 1,000 mcg  -     Comprehensive Metabolic Panel; Future; Expected date: 10/06/2022  -     CBC Auto Differential; Future; Expected date: 10/06/2022  -     Sedimentation rate; Future; Expected date: 10/06/2022  -     C-Reactive Protein; Future; Expected date: 10/06/2022    Chronic pain syndrome  -     ketorolac injection 60 mg  -     cyanocobalamin injection 1,000 mcg  -     Comprehensive Metabolic Panel; Future; Expected date: 10/06/2022  -     CBC Auto Differential; Future; Expected date: 10/06/2022  -     Sedimentation rate; Future; Expected date: 10/06/2022  -     C-Reactive Protein; Future; Expected date: 10/06/2022    Sciatic pain, right  -     ketorolac injection 60 mg  -     cyanocobalamin injection 1,000 mcg  -     Comprehensive Metabolic Panel; Future; Expected date: 10/06/2022  -     CBC Auto Differential; Future; Expected date: 10/06/2022  -     Sedimentation rate; Future; Expected date: 10/06/2022  -     C-Reactive Protein; Future; Expected date: 10/06/2022    Immunocompromised state due to drug therapy  -     ketorolac injection 60 mg  -     cyanocobalamin injection 1,000 mcg  -     Comprehensive Metabolic Panel; Future; Expected date: 10/06/2022  -     CBC Auto Differential; Future; Expected date: 10/06/2022  -     Sedimentation rate; Future; Expected date: 10/06/2022  -     C-Reactive Protein; Future; Expected date: 10/06/2022    Spasm of back muscles  -     tiZANidine (ZANAFLEX) 2 MG tablet; Take 1 tablet (2 mg total) by mouth every 8 (eight) hours.  Dispense: 90 tablet; Refill: 0  -     Comprehensive Metabolic Panel; Future; Expected date: 10/06/2022  -     CBC Auto Differential; Future; Expected date: 10/06/2022  -     Sedimentation rate; Future; Expected date: 10/06/2022  -     C-Reactive Protein; Future; Expected date: 10/06/2022      Assessment:  72 year old female with  Psoriatic arthritis, psoriasis, hx of MICHAEL  positive, histone antibody positive  --hx of XANDER s/p infusion  --NCGS  --CKD, stable on mobic 7.5 mg        1.start skyrizi  2.add zanaflex  3.f/u 3 to 4 mths  4. Information on skyrizi given and f/u 3 to 4 mths

## 2022-10-06 NOTE — PROGRESS NOTES
2 pt identifiers used  Allergies reviewed      Administered 2 cc ( 30 mg/ml ) of toradol to the right upper outer gluteal. Patient tolerated injections well. Informed of s/s to report verbalized understanding. No adverse reactions noted. .    Administered 1 cc ( 1000 mcg/ml ) of b12 to the right upper outer gluteal. Informed of s/s to report verbalized understanding. No adverse reactions noted.            Provided teaching and education for Skyrizi in office. Nurse explained the proper cleaning and administration technique to patient. She stated someone at home will be giving it to her so she did not want to practice using practice pen. Administered skyrizi  150 mg / ml to the left upper outer gluteal. Patient tolerated well. Waited in clinic for the allotted time frame to make sure of no allergic reactions.    Left facility in stable condition      Ndc [id number]    Lot [id number]    Exp sep 2023

## 2022-10-07 ENCOUNTER — SPECIALTY PHARMACY (OUTPATIENT)
Dept: PHARMACY | Facility: CLINIC | Age: 72
End: 2022-10-07

## 2022-10-07 NOTE — TELEPHONE ENCOUNTER
Hello, this is Anjali Salas with [facility].  We are working on your prescription that your doctor has sent us. We will be working with your insurance to get this approved for you. We will be calling you along the way with updates on your medication.  If you have any questions, you can reach us at [phone].    Welcome call outcome: Left voicemail  Patient received week 0 loading dose in office on 10/6/22 per chart review and will only need 1 more loading dose due 11/5.

## 2022-10-10 NOTE — TELEPHONE ENCOUNTER
Benefits Investigation - Harry    Insurance name: [employer] Medicare Part D   Copay: $[id number]  JONY LVL: none    Forwarding to FA.

## 2022-10-10 NOTE — TELEPHONE ENCOUNTER
Harry HENDRICKS submitted.  [facility] Key: [id number]     Harry HENDRICKS approved from 1/1/22 to 10/7/23.  No case ID provided.

## 2022-10-13 NOTE — TELEPHONE ENCOUNTER
Incoming call returning calls. Informed pt of copay and pt would like to proceed with PAP. Provided hhs/income/consent and would like application sent to address on file. Keny[place] Lorenzana.

## 2022-10-13 NOTE — TELEPHONE ENCOUNTER
Call attempt to patient regarding Skyrizi prescription we received. Calling to inform patient Skyrizi has been approved through insurance with a high copay. No grants are currently available at this time but am able to apply patient for  assistance. WILL NEED HH SIZE, ANNUAL INCOME, & HOW PT WANTS TO RECEIVE THEIR PORTION OF APPLICATION. Will continue to follow up.

## 2022-10-14 NOTE — TELEPHONE ENCOUNTER
Sent a staff message to [employer] regarding Skyrizi assistance application and faxed application prescription to [phone] for provider review and signature. Sent patient portion via mail as requested, will continue to follow up.

## 2022-10-25 NOTE — TELEPHONE ENCOUNTER
Patient portion received. Submitted complete application to [employer] via fax @ [phone]. Will continue to follow up until final determination is made.

## 2022-11-07 NOTE — TELEPHONE ENCOUNTER
Status check with [employer]. Rep Kurtzice stated proof of income is needed to process application. Outgoing call to patient. No answer, LVM.

## 2022-11-09 NOTE — TELEPHONE ENCOUNTER
Outgoing call to patient to let her know proof of income is needed to further process her Skyrizi patient assistance application. Provided her with my e-mail address for her to send document. Will continue to follow up.

## 2022-11-15 NOTE — TELEPHONE ENCOUNTER
Outgoing call to follow up about with patient about Skyrizi PAP. Income document is still needed to process application. No answer, LVM.

## 2022-11-18 NOTE — TELEPHONE ENCOUNTER
2nd outgoing call attempt to follow up about with patient about Skyrizi PAP. Income document is still needed to process application. No answer, LVM.

## 2022-11-22 NOTE — TELEPHONE ENCOUNTER
3rd outgoing call attempt to follow up about with patient about Skyrizi PAP. Income document is still needed to process application. No answer, LVM.

## 2022-11-25 NOTE — TELEPHONE ENCOUNTER
4th outgoing call attempt to follow up about with patient about Skyrizi PAP. Income document is still needed to process application. No answer, LVM. Patient does not have an active portal.

## 2023-01-24 ENCOUNTER — TELEPHONE (OUTPATIENT)
Dept: RHEUMATOLOGY | Facility: CLINIC | Age: 73
End: 2023-01-24
Payer: MEDICARE

## 2023-01-24 NOTE — TELEPHONE ENCOUNTER
Called patient and cancelled appointment  ----- Message from Shreya Burton sent at 1/23/2023  3:23 PM CST -----  Contact: Patient  Type:  Patient Call          Who Called: Patient         Does the patient know what this is regarding?: [facility] ; please advise           Would the patient rather a call back or a response via [facility]? Call           Best Call Back Number: [phone]             Additional Information:

## 2023-04-11 ENCOUNTER — OFFICE VISIT (OUTPATIENT)
Dept: RHEUMATOLOGY | Facility: CLINIC | Age: 73
End: 2023-04-11
Payer: MEDICARE

## 2023-04-11 ENCOUNTER — LAB VISIT (OUTPATIENT)
Dept: LAB | Facility: HOSPITAL | Age: 73
End: 2023-04-11
Attending: INTERNAL MEDICINE
Payer: MEDICARE

## 2023-04-11 VITALS
HEIGHT: 60 IN | BODY MASS INDEX: 31.73 KG/M2 | DIASTOLIC BLOOD PRESSURE: 82 MMHG | WEIGHT: 161.63 LBS | SYSTOLIC BLOOD PRESSURE: 170 MMHG | HEART RATE: 60 BPM

## 2023-04-11 DIAGNOSIS — E74.39 GLUCOSE INTOLERANCE: ICD-10-CM

## 2023-04-11 DIAGNOSIS — Z79.899 IMMUNOCOMPROMISED STATE DUE TO DRUG THERAPY: Primary | ICD-10-CM

## 2023-04-11 DIAGNOSIS — L40.50 PSA (PSORIATIC ARTHRITIS): ICD-10-CM

## 2023-04-11 DIAGNOSIS — E03.9 HYPOTHYROIDISM, UNSPECIFIED TYPE: ICD-10-CM

## 2023-04-11 DIAGNOSIS — E88.819 INSULIN RESISTANCE: ICD-10-CM

## 2023-04-11 DIAGNOSIS — D50.9 IRON DEFICIENCY ANEMIA, UNSPECIFIED IRON DEFICIENCY ANEMIA TYPE: ICD-10-CM

## 2023-04-11 DIAGNOSIS — Z79.899 HIGH RISK MEDICATION USE: ICD-10-CM

## 2023-04-11 DIAGNOSIS — D84.821 IMMUNOCOMPROMISED STATE DUE TO DRUG THERAPY: Primary | ICD-10-CM

## 2023-04-11 DIAGNOSIS — Z79.899 IMMUNOCOMPROMISED STATE DUE TO DRUG THERAPY: ICD-10-CM

## 2023-04-11 DIAGNOSIS — D84.821 IMMUNOCOMPROMISED STATE DUE TO DRUG THERAPY: ICD-10-CM

## 2023-04-11 LAB
BASOPHILS # BLD AUTO: 0.06 K/UL (ref 0–0.2)
BASOPHILS NFR BLD: 0.7 % (ref 0–1.9)
DIFFERENTIAL METHOD: ABNORMAL
EOSINOPHIL # BLD AUTO: 0.3 K/UL (ref 0–0.5)
EOSINOPHIL NFR BLD: 3.6 % (ref 0–8)
ERYTHROCYTE [DISTWIDTH] IN BLOOD BY AUTOMATED COUNT: 14.4 % (ref 11.5–14.5)
ERYTHROCYTE [SEDIMENTATION RATE] IN BLOOD BY PHOTOMETRIC METHOD: 29 MM/HR (ref 0–36)
HCT VFR BLD AUTO: 40.6 % (ref 37–48.5)
HGB BLD-MCNC: 12.8 G/DL (ref 12–16)
IMM GRANULOCYTES # BLD AUTO: 0.02 K/UL (ref 0–0.04)
IMM GRANULOCYTES NFR BLD AUTO: 0.2 % (ref 0–0.5)
LYMPHOCYTES # BLD AUTO: 2.4 K/UL (ref 1–4.8)
LYMPHOCYTES NFR BLD: 27.3 % (ref 18–48)
MCH RBC QN AUTO: 25.9 PG (ref 27–31)
MCHC RBC AUTO-ENTMCNC: 31.5 G/DL (ref 32–36)
MCV RBC AUTO: 82 FL (ref 82–98)
MONOCYTES # BLD AUTO: 0.7 K/UL (ref 0.3–1)
MONOCYTES NFR BLD: 8.3 % (ref 4–15)
NEUTROPHILS # BLD AUTO: 5.2 K/UL (ref 1.8–7.7)
NEUTROPHILS NFR BLD: 59.9 % (ref 38–73)
NRBC BLD-RTO: 0 /100 WBC
PLATELET # BLD AUTO: 278 K/UL (ref 150–450)
PMV BLD AUTO: 11.9 FL (ref 9.2–12.9)
RBC # BLD AUTO: 4.95 M/UL (ref 4–5.4)
WBC # BLD AUTO: 8.69 K/UL (ref 3.9–12.7)

## 2023-04-11 PROCEDURE — 84443 ASSAY THYROID STIM HORMONE: CPT | Performed by: INTERNAL MEDICINE

## 2023-04-11 PROCEDURE — 85652 RBC SED RATE AUTOMATED: CPT | Performed by: INTERNAL MEDICINE

## 2023-04-11 PROCEDURE — 84466 ASSAY OF TRANSFERRIN: CPT | Performed by: INTERNAL MEDICINE

## 2023-04-11 PROCEDURE — 99213 OFFICE O/P EST LOW 20 MIN: CPT | Mod: PBBFAC,PN | Performed by: INTERNAL MEDICINE

## 2023-04-11 PROCEDURE — 84481 FREE ASSAY (FT-3): CPT | Performed by: INTERNAL MEDICINE

## 2023-04-11 PROCEDURE — 96372 THER/PROPH/DIAG INJ SC/IM: CPT | Mod: PBBFAC,PN

## 2023-04-11 PROCEDURE — 83516 IMMUNOASSAY NONANTIBODY: CPT | Performed by: INTERNAL MEDICINE

## 2023-04-11 PROCEDURE — 80053 COMPREHEN METABOLIC PANEL: CPT | Performed by: INTERNAL MEDICINE

## 2023-04-11 PROCEDURE — 86140 C-REACTIVE PROTEIN: CPT | Performed by: INTERNAL MEDICINE

## 2023-04-11 PROCEDURE — 99214 OFFICE O/P EST MOD 30 MIN: CPT | Mod: 25,S$PBB,, | Performed by: INTERNAL MEDICINE

## 2023-04-11 PROCEDURE — 99214 PR OFFICE/OUTPT VISIT, EST, LEVL IV, 30-39 MIN: ICD-10-PCS | Mod: 25,S$PBB,, | Performed by: INTERNAL MEDICINE

## 2023-04-11 PROCEDURE — 82306 VITAMIN D 25 HYDROXY: CPT | Performed by: INTERNAL MEDICINE

## 2023-04-11 PROCEDURE — 86800 THYROGLOBULIN ANTIBODY: CPT | Performed by: INTERNAL MEDICINE

## 2023-04-11 PROCEDURE — 85025 COMPLETE CBC W/AUTO DIFF WBC: CPT | Performed by: INTERNAL MEDICINE

## 2023-04-11 PROCEDURE — 99999 PR PBB SHADOW E&M-EST. PATIENT-LVL III: ICD-10-PCS | Mod: PBBFAC,,, | Performed by: INTERNAL MEDICINE

## 2023-04-11 PROCEDURE — 99999 PR PBB SHADOW E&M-EST. PATIENT-LVL III: CPT | Mod: PBBFAC,,, | Performed by: INTERNAL MEDICINE

## 2023-04-11 PROCEDURE — 84439 ASSAY OF FREE THYROXINE: CPT | Performed by: INTERNAL MEDICINE

## 2023-04-11 RX ORDER — CYANOCOBALAMIN 1000 UG/ML
1000 INJECTION, SOLUTION INTRAMUSCULAR; SUBCUTANEOUS
Status: COMPLETED | OUTPATIENT
Start: 2023-04-11 | End: 2023-04-11

## 2023-04-11 RX ORDER — SEMAGLUTIDE 1.34 MG/ML
0.25 INJECTION, SOLUTION SUBCUTANEOUS
Qty: 0.75 ML | Refills: 11 | Status: SHIPPED | OUTPATIENT
Start: 2023-04-11 | End: 2023-10-31

## 2023-04-11 RX ORDER — CLOBETASOL PROPIONATE 0.5 MG/G
OINTMENT TOPICAL 2 TIMES DAILY
Qty: 45 G | Refills: 3 | Status: SHIPPED | OUTPATIENT
Start: 2023-04-11

## 2023-04-11 RX ORDER — HYDROXYCHLOROQUINE SULFATE 200 MG/1
200 TABLET, FILM COATED ORAL 2 TIMES DAILY
Qty: 60 TABLET | Refills: 5 | Status: SHIPPED | OUTPATIENT
Start: 2023-04-11 | End: 2023-10-31

## 2023-04-11 RX ADMIN — CYANOCOBALAMIN 1000 MCG: 1000 INJECTION, SOLUTION INTRAMUSCULAR at 04:04

## 2023-04-11 ASSESSMENT — ROUTINE ASSESSMENT OF PATIENT INDEX DATA (RAPID3)
TOTAL RAPID3 SCORE: 1.72
PAIN SCORE: 2
MDHAQ FUNCTION SCORE: 0.2
PATIENT GLOBAL ASSESSMENT SCORE: 2.5
FATIGUE SCORE: 0
PSYCHOLOGICAL DISTRESS SCORE: 0

## 2023-04-11 NOTE — PROGRESS NOTES
Administered 1 cc ( 1000 mcg/ml ) of b12 to the left arm. Informed of s/s to report verbalized understanding. No adverse reactions noted.

## 2023-04-11 NOTE — PROGRESS NOTES
Subjective:       Patient ID: Letha Enciso is a 73 y.o. female.    Chief Complaint: Disease Management      Follow up: 72 year old female  psoriatic arthritis and psoriasis. She is doing fair. She states hip/leg pain resolved so she did not proceed with x-rays as ordered. She has decided to stay off biologic therapy due to concerns for immune suppression. Overall, her joint pain is minimal on mobic 7.5 mg daily. She has not needed to take tramadol for severe pain. She has minimal stiffness in her hands. She has psoriasis on both palms. She has not been following gluten free diet and report epigastric pain and bloating. She changed her diet again and is eliminating gluten now.    We reviewed labs from 2/2022.    Current tx:  1. mobic 7.5 mg  2.  Off biologic    Review of Systems   Constitutional:  Positive for activity change and fatigue. Negative for appetite change and chills.   Eyes:  Negative for visual disturbance.   Respiratory:  Negative for cough and shortness of breath.    Cardiovascular:  Negative for chest pain, palpitations and leg swelling.   Gastrointestinal:  Negative for abdominal pain, constipation, diarrhea, nausea and vomiting.   Musculoskeletal:  Positive for arthralgias, gait problem, neck pain and neck stiffness.   Skin:  Positive for rash.   Neurological:  Negative for dizziness, weakness and light-headedness.       Objective:     Vitals:    04/11/23 1417   BP: (!) 170/82   Pulse: 60       Past Medical History:   Diagnosis Date    Anemia      No past surgical history on file.       Physical Exam   Constitutional: She is oriented to person, place, and time. No distress.   HENT:   Head: Normocephalic and atraumatic.   Eyes: Pupils are equal, round, and reactive to light. Right eye exhibits no discharge. Left eye exhibits no discharge. Right conjunctiva is not injected. Left conjunctiva is not injected.   Neck: No JVD present. No thyromegaly present.   Cardiovascular: Normal rate, regular  rhythm and normal heart sounds. Exam reveals no gallop, no friction rub and no decreased pulses.   No murmur heard.  Pulmonary/Chest: Effort normal and breath sounds normal. She has no wheezes. She has no rales. She exhibits no tenderness.   Abdominal: There is no abdominal tenderness. There is no rebound and no guarding.   Musculoskeletal:         General: Tenderness present.      Right shoulder: Normal.      Left shoulder: Normal.      Right elbow: Normal.      Left elbow: Normal.      Right wrist: Normal.      Left wrist: Normal.      Cervical back: Neck supple.      Right knee: Normal.      Left knee: Normal.   Lymphadenopathy:     She has no cervical adenopathy.   Neurological: She is alert and oriented to person, place, and time. Gait normal.   Skin: Skin is dry. Rash (psoriasis on the palms (r hand >L)) noted. No erythema. There is pallor.   Psychiatric: Mood and affect normal.   Vitals reviewed.      Right Side Rheumatological Exam     Examination finds the shoulder, elbow, wrist, knee, 1st PIP, 1st MCP, 2nd PIP, 2nd MCP, 3rd PIP, 3rd MCP, 4th PIP, 4th MCP, 5th PIP and 5th MCP normal.    The patient is tender to palpation of the 1st PIP, 1st MCP, 2nd PIP, 2nd MCP, 3rd PIP, 3rd MCP, 4th PIP, 4th MCP, 5th PIP, 5th MCP and 2nd DIP    She has swelling of the 1st PIP, 1st MCP, 2nd PIP, 2nd MCP, 3rd PIP, 3rd MCP, 4th PIP, 4th MCP, 5th PIP and 5th MCP    The patient has an enlarged 2nd DIP, 3rd DIP, 4th DIP and 5th DIP    Shoulder Exam   Tenderness Location: no tenderness    Range of Motion   Active abduction:  abnormal   Adduction: abnormal  Sensation: normal    Knee Exam   Patellofemoral Crepitus: positive  Sensation: normal    Hip Exam   Tenderness Location: posterior  Sensation: normal    Elbow/Wrist Exam   Tenderness Location: no tenderness  Sensation: normal    Left Side Rheumatological Exam     Examination finds the shoulder, elbow, wrist, knee, 1st PIP, 1st MCP, 2nd PIP, 2nd MCP, 3rd PIP, 3rd MCP, 4th PIP,  4th MCP, 5th PIP and 5th MCP normal.    The patient is tender to palpation of the 1st PIP, 1st MCP, 2nd PIP, 2nd MCP, 3rd PIP, 3rd MCP, 4th PIP, 4th MCP, 5th PIP and 5th MCP.    She has swelling of the 1st PIP, 1st MCP, 2nd PIP, 2nd MCP, 3rd PIP, 3rd MCP, 4th PIP, 4th MCP, 5th PIP and 5th MCP    The patient has an enlarged 2nd DIP, 3rd DIP, 4th DIP and 5th DIP.    Shoulder Exam   Tenderness Location: no tenderness    Range of Motion   Active abduction:  abnormal   Sensation: normal    Knee Exam     Patellofemoral Crepitus: positive  Sensation: normal    Hip Exam   Tenderness Location: posterior  Sensation: normal    Elbow/Wrist Exam   Sensation: normal      Back/Neck Exam   General Inspection   Gait: normal       Results for orders placed or performed in visit on 10/06/22   COVID-19 (SARS CoV-2) IgG Antibody Quant   Result Value Ref Range    COVID-19 (SARS CoV-2) IgG Antibody Quantitative 1001.8 AU/ml    COVID-19 (SARS CoV-2) IgG Antibody Interpretation Positive    CBC Auto Differential   Result Value Ref Range    WBC 6.81 3.90 - 12.70 K/uL    RBC 4.52 4.00 - 5.40 M/uL    Hemoglobin 11.8 (L) 12.0 - 16.0 g/dL    Hematocrit 39.2 37.0 - 48.5 %    MCV 87 82 - 98 fL    MCH 26.1 (L) 27.0 - 31.0 pg    MCHC 30.1 (L) 32.0 - 36.0 g/dL    RDW 13.9 11.5 - 14.5 %    Platelets 259 150 - 450 K/uL    MPV 12.0 9.2 - 12.9 fL    Immature Granulocytes 0.1 0.0 - 0.5 %    Gran # (ANC) 4.2 1.8 - 7.7 K/uL    Immature Grans (Abs) 0.01 0.00 - 0.04 K/uL    Lymph # 1.6 1.0 - 4.8 K/uL    Mono # 0.7 0.3 - 1.0 K/uL    Eos # 0.3 0.0 - 0.5 K/uL    Baso # 0.04 0.00 - 0.20 K/uL    nRBC 0 0 /100 WBC    Gran % 61.8 38.0 - 73.0 %    Lymph % 22.8 18.0 - 48.0 %    Mono % 10.3 4.0 - 15.0 %    Eosinophil % 4.4 0.0 - 8.0 %    Basophil % 0.6 0.0 - 1.9 %    Differential Method Automated    Comprehensive Metabolic Panel   Result Value Ref Range    Sodium 142 136 - 145 mmol/L    Potassium 3.8 3.5 - 5.1 mmol/L    Chloride 110 95 - 110 mmol/L    CO2 22 (L) 23 - 29  mmol/L    Glucose 111 (H) 70 - 110 mg/dL    BUN 11 8 - 23 mg/dL    Creatinine 0.9 0.5 - 1.4 mg/dL    Calcium 9.5 8.7 - 10.5 mg/dL    Total Protein 6.7 6.0 - 8.4 g/dL    Albumin 3.7 3.5 - 5.2 g/dL    Total Bilirubin 0.3 0.1 - 1.0 mg/dL    Alkaline Phosphatase 100 55 - 135 U/L    AST 21 10 - 40 U/L    ALT 15 10 - 44 U/L    Anion Gap 10 8 - 16 mmol/L    eGFR >60.0 >60 mL/min/1.73 m^2   Sedimentation rate   Result Value Ref Range    Sed Rate 22 0 - 36 mm/Hr   C-Reactive Protein   Result Value Ref Range    CRP 1.3 0.0 - 8.2 mg/L       Assessment:       1. Immunocompromised state due to drug therapy    2. PSA (psoriatic arthritis)    3. High risk medication use    4. Hypothyroidism, unspecified type    5. Glucose intolerance    6. Insulin resistance    7. Iron deficiency anemia, unspecified iron deficiency anemia type            Plan:       Immunocompromised state due to drug therapy  -     hydrOXYchloroQUINE (PLAQUENIL) 200 mg tablet; Take 1 tablet (200 mg total) by mouth 2 (two) times daily.  Dispense: 60 tablet; Refill: 5  -     Sedimentation rate; Future; Expected date: 04/11/2023  -     C-Reactive Protein; Future; Expected date: 04/11/2023  -     Comprehensive Metabolic Panel; Future; Expected date: 04/11/2023  -     CBC Auto Differential; Future; Expected date: 04/11/2023  -     Transferrin; Future; Expected date: 04/11/2023  -     Iron and TIBC; Future; Expected date: 04/11/2023  -     Anti-Thyroglobulin Antibody; Future; Expected date: 04/11/2023  -     T4, Free; Future; Expected date: 04/11/2023  -     Thyroid Peroxidase Antibody; Future; Expected date: 04/11/2023  -     TSH; Future; Expected date: 04/11/2023  -     T3, Free; Future; Expected date: 04/11/2023  -     Anti-Histone Antibody; Future; Expected date: 04/11/2023  -     Vitamin D; Future; Expected date: 04/11/2023    PSA (psoriatic arthritis)  -     clobetasol 0.05% (TEMOVATE) 0.05 % Oint; Apply topically 2 (two) times daily.  Dispense: 45 g; Refill:  3  -     hydrOXYchloroQUINE (PLAQUENIL) 200 mg tablet; Take 1 tablet (200 mg total) by mouth 2 (two) times daily.  Dispense: 60 tablet; Refill: 5  -     Sedimentation rate; Future; Expected date: 04/11/2023  -     C-Reactive Protein; Future; Expected date: 04/11/2023  -     Comprehensive Metabolic Panel; Future; Expected date: 04/11/2023  -     CBC Auto Differential; Future; Expected date: 04/11/2023  -     Transferrin; Future; Expected date: 04/11/2023  -     Iron and TIBC; Future; Expected date: 04/11/2023  -     Anti-Thyroglobulin Antibody; Future; Expected date: 04/11/2023  -     T4, Free; Future; Expected date: 04/11/2023  -     Thyroid Peroxidase Antibody; Future; Expected date: 04/11/2023  -     TSH; Future; Expected date: 04/11/2023  -     T3, Free; Future; Expected date: 04/11/2023  -     Anti-Histone Antibody; Future; Expected date: 04/11/2023  -     Vitamin D; Future; Expected date: 04/11/2023  -     cyanocobalamin injection 1,000 mcg    High risk medication use  -     hydrOXYchloroQUINE (PLAQUENIL) 200 mg tablet; Take 1 tablet (200 mg total) by mouth 2 (two) times daily.  Dispense: 60 tablet; Refill: 5  -     Sedimentation rate; Future; Expected date: 04/11/2023  -     C-Reactive Protein; Future; Expected date: 04/11/2023  -     Comprehensive Metabolic Panel; Future; Expected date: 04/11/2023  -     CBC Auto Differential; Future; Expected date: 04/11/2023  -     Transferrin; Future; Expected date: 04/11/2023  -     Iron and TIBC; Future; Expected date: 04/11/2023  -     Anti-Thyroglobulin Antibody; Future; Expected date: 04/11/2023  -     T4, Free; Future; Expected date: 04/11/2023  -     Thyroid Peroxidase Antibody; Future; Expected date: 04/11/2023  -     TSH; Future; Expected date: 04/11/2023  -     T3, Free; Future; Expected date: 04/11/2023  -     Anti-Histone Antibody; Future; Expected date: 04/11/2023  -     Vitamin D; Future; Expected date: 04/11/2023  -     cyanocobalamin injection 1,000  mcg    Hypothyroidism, unspecified type  -     hydrOXYchloroQUINE (PLAQUENIL) 200 mg tablet; Take 1 tablet (200 mg total) by mouth 2 (two) times daily.  Dispense: 60 tablet; Refill: 5  -     Sedimentation rate; Future; Expected date: 04/11/2023  -     C-Reactive Protein; Future; Expected date: 04/11/2023  -     Comprehensive Metabolic Panel; Future; Expected date: 04/11/2023  -     CBC Auto Differential; Future; Expected date: 04/11/2023  -     Transferrin; Future; Expected date: 04/11/2023  -     Iron and TIBC; Future; Expected date: 04/11/2023  -     Anti-Thyroglobulin Antibody; Future; Expected date: 04/11/2023  -     T4, Free; Future; Expected date: 04/11/2023  -     Thyroid Peroxidase Antibody; Future; Expected date: 04/11/2023  -     TSH; Future; Expected date: 04/11/2023  -     T3, Free; Future; Expected date: 04/11/2023  -     Anti-Histone Antibody; Future; Expected date: 04/11/2023  -     Vitamin D; Future; Expected date: 04/11/2023  -     cyanocobalamin injection 1,000 mcg    Glucose intolerance  -     semaglutide (OZEMPIC) 0.25 mg or 0.5 mg(2 mg/1.5 mL) pen injector; Inject 0.25 mg into the skin every 7 days.  Dispense: 0.75 mL; Refill: 11  -     hydrOXYchloroQUINE (PLAQUENIL) 200 mg tablet; Take 1 tablet (200 mg total) by mouth 2 (two) times daily.  Dispense: 60 tablet; Refill: 5  -     Sedimentation rate; Future; Expected date: 04/11/2023  -     C-Reactive Protein; Future; Expected date: 04/11/2023  -     Comprehensive Metabolic Panel; Future; Expected date: 04/11/2023  -     CBC Auto Differential; Future; Expected date: 04/11/2023  -     Transferrin; Future; Expected date: 04/11/2023  -     Iron and TIBC; Future; Expected date: 04/11/2023  -     Anti-Thyroglobulin Antibody; Future; Expected date: 04/11/2023  -     T4, Free; Future; Expected date: 04/11/2023  -     Thyroid Peroxidase Antibody; Future; Expected date: 04/11/2023  -     TSH; Future; Expected date: 04/11/2023  -     T3, Free; Future;  Expected date: 04/11/2023  -     Anti-Histone Antibody; Future; Expected date: 04/11/2023  -     Vitamin D; Future; Expected date: 04/11/2023  -     cyanocobalamin injection 1,000 mcg    Insulin resistance  -     semaglutide (OZEMPIC) 0.25 mg or 0.5 mg(2 mg/1.5 mL) pen injector; Inject 0.25 mg into the skin every 7 days.  Dispense: 0.75 mL; Refill: 11  -     Sedimentation rate; Future; Expected date: 04/11/2023  -     C-Reactive Protein; Future; Expected date: 04/11/2023  -     Comprehensive Metabolic Panel; Future; Expected date: 04/11/2023  -     CBC Auto Differential; Future; Expected date: 04/11/2023  -     Transferrin; Future; Expected date: 04/11/2023  -     Iron and TIBC; Future; Expected date: 04/11/2023  -     Anti-Thyroglobulin Antibody; Future; Expected date: 04/11/2023  -     T4, Free; Future; Expected date: 04/11/2023  -     Thyroid Peroxidase Antibody; Future; Expected date: 04/11/2023  -     TSH; Future; Expected date: 04/11/2023  -     T3, Free; Future; Expected date: 04/11/2023  -     Anti-Histone Antibody; Future; Expected date: 04/11/2023  -     Vitamin D; Future; Expected date: 04/11/2023  -     cyanocobalamin injection 1,000 mcg    Iron deficiency anemia, unspecified iron deficiency anemia type  -     Sedimentation rate; Future; Expected date: 04/11/2023  -     C-Reactive Protein; Future; Expected date: 04/11/2023  -     Comprehensive Metabolic Panel; Future; Expected date: 04/11/2023  -     CBC Auto Differential; Future; Expected date: 04/11/2023  -     Transferrin; Future; Expected date: 04/11/2023  -     Iron and TIBC; Future; Expected date: 04/11/2023  -     Anti-Thyroglobulin Antibody; Future; Expected date: 04/11/2023  -     T4, Free; Future; Expected date: 04/11/2023  -     Thyroid Peroxidase Antibody; Future; Expected date: 04/11/2023  -     TSH; Future; Expected date: 04/11/2023  -     T3, Free; Future; Expected date: 04/11/2023  -     Anti-Histone Antibody; Future; Expected date:  04/11/2023  -     Vitamin D; Future; Expected date: 04/11/2023  -     cyanocobalamin injection 1,000 mcg        Assessment:  72 year old female with  Psoriatic arthritis, psoriasis, hx of MICHAEL positive, histone antibody positive  --hx of XANDER s/p infusion  --NCGS  --CKD, stable on mobic 7.5 mg        1.add plaquenil  2.add zanaflex  3.f/u 3 to 4 mths  4. Add ozempic    More than 50% of the  30 minute encounter was spent face to face counseling the patient regarding current status and future plan of care as well as side effects  of the medications. All questions were answered to patient's satisfaction also includes  non-face to face time preparing to see the patient (eg, review of tests), Obtaining and/or reviewing separately obtained history, Documenting clinical information in the electronic or other health record, Independently interpreting results

## 2023-04-12 LAB
25(OH)D3+25(OH)D2 SERPL-MCNC: 56 NG/ML (ref 30–96)
ALBUMIN SERPL BCP-MCNC: 4.3 G/DL (ref 3.5–5.2)
ALP SERPL-CCNC: 108 U/L (ref 55–135)
ALT SERPL W/O P-5'-P-CCNC: 19 U/L (ref 10–44)
ANION GAP SERPL CALC-SCNC: 12 MMOL/L (ref 8–16)
AST SERPL-CCNC: 30 U/L (ref 10–40)
BILIRUB SERPL-MCNC: 0.5 MG/DL (ref 0.1–1)
BUN SERPL-MCNC: 13 MG/DL (ref 8–23)
CALCIUM SERPL-MCNC: 10.2 MG/DL (ref 8.7–10.5)
CHLORIDE SERPL-SCNC: 103 MMOL/L (ref 95–110)
CO2 SERPL-SCNC: 23 MMOL/L (ref 23–29)
CREAT SERPL-MCNC: 1 MG/DL (ref 0.5–1.4)
CRP SERPL-MCNC: 1.2 MG/L (ref 0–8.2)
EST. GFR  (NO RACE VARIABLE): 59.9 ML/MIN/1.73 M^2
GLUCOSE SERPL-MCNC: 87 MG/DL (ref 70–110)
IRON SERPL-MCNC: 77 UG/DL (ref 30–160)
POTASSIUM SERPL-SCNC: 3.9 MMOL/L (ref 3.5–5.1)
PROT SERPL-MCNC: 7.7 G/DL (ref 6–8.4)
SATURATED IRON: 18 % (ref 20–50)
SODIUM SERPL-SCNC: 138 MMOL/L (ref 136–145)
T3FREE SERPL-MCNC: 2.3 PG/ML (ref 2.3–4.2)
T4 FREE SERPL-MCNC: 1.06 NG/DL (ref 0.71–1.51)
THYROGLOB AB SERPL IA-ACNC: <4 IU/ML (ref 0–3.9)
THYROPEROXIDASE IGG SERPL-ACNC: 7.3 IU/ML
TOTAL IRON BINDING CAPACITY: 425 UG/DL (ref 250–450)
TRANSFERRIN SERPL-MCNC: 287 MG/DL (ref 200–375)
TRANSFERRIN SERPL-MCNC: 287 MG/DL (ref 200–375)
TSH SERPL DL<=0.005 MIU/L-ACNC: 0.81 UIU/ML (ref 0.4–4)

## 2023-04-14 LAB — HISTONE IGG SER IA-ACNC: 1.6 UNITS (ref 0–0.9)

## 2023-05-08 ENCOUNTER — TELEPHONE (OUTPATIENT)
Dept: RHEUMATOLOGY | Facility: CLINIC | Age: 73
End: 2023-05-08
Payer: MEDICARE

## 2023-05-08 NOTE — TELEPHONE ENCOUNTER
----- Message from Ethan Cantu MD sent at 4/17/2023 11:12 AM CDT -----  Histone is positive and iron saturation is low otherwise labs are stable. We will address at your next visit

## 2023-06-14 DIAGNOSIS — L40.8 PSORIASIS WITH PUSTULES: ICD-10-CM

## 2023-06-14 DIAGNOSIS — G89.4 CHRONIC PAIN SYNDROME: ICD-10-CM

## 2023-06-14 DIAGNOSIS — M54.31 SCIATIC PAIN, RIGHT: ICD-10-CM

## 2023-06-14 DIAGNOSIS — L40.50 PSA (PSORIATIC ARTHRITIS): ICD-10-CM

## 2023-06-14 RX ORDER — MELOXICAM 7.5 MG/1
TABLET ORAL
Qty: 90 TABLET | Refills: 3 | Status: SHIPPED | OUTPATIENT
Start: 2023-06-14

## 2023-10-05 ENCOUNTER — TELEPHONE (OUTPATIENT)
Dept: RHEUMATOLOGY | Facility: CLINIC | Age: 73
End: 2023-10-05
Payer: MEDICARE

## 2023-10-05 NOTE — TELEPHONE ENCOUNTER
Called and spoke with the patient about having to cancel her appt on 10/5 with Dr. Cantu due to power outage.     And that her office will call her tomorrow to reschedule the appointment.     Patient understood.

## 2023-10-31 ENCOUNTER — OFFICE VISIT (OUTPATIENT)
Dept: RHEUMATOLOGY | Facility: CLINIC | Age: 73
End: 2023-10-31
Payer: MEDICARE

## 2023-10-31 ENCOUNTER — LAB VISIT (OUTPATIENT)
Dept: LAB | Facility: HOSPITAL | Age: 73
End: 2023-10-31
Attending: PHYSICIAN ASSISTANT
Payer: MEDICARE

## 2023-10-31 VITALS
DIASTOLIC BLOOD PRESSURE: 89 MMHG | BODY MASS INDEX: 32.39 KG/M2 | HEART RATE: 60 BPM | SYSTOLIC BLOOD PRESSURE: 140 MMHG | WEIGHT: 165 LBS | HEIGHT: 60 IN

## 2023-10-31 DIAGNOSIS — Z79.899 HIGH RISK MEDICATION USE: ICD-10-CM

## 2023-10-31 DIAGNOSIS — E61.1 IRON DEFICIENCY: ICD-10-CM

## 2023-10-31 DIAGNOSIS — L40.3 PSORIASIS PALMARIS: ICD-10-CM

## 2023-10-31 DIAGNOSIS — L40.50 PSA (PSORIATIC ARTHRITIS): Primary | ICD-10-CM

## 2023-10-31 DIAGNOSIS — L40.50 PSA (PSORIATIC ARTHRITIS): ICD-10-CM

## 2023-10-31 LAB
ALBUMIN SERPL BCP-MCNC: 3.9 G/DL (ref 3.5–5.2)
ALP SERPL-CCNC: 106 U/L (ref 55–135)
ALT SERPL W/O P-5'-P-CCNC: 19 U/L (ref 10–44)
ANION GAP SERPL CALC-SCNC: 5 MMOL/L (ref 8–16)
AST SERPL-CCNC: 30 U/L (ref 10–40)
BASOPHILS # BLD AUTO: 0.07 K/UL (ref 0–0.2)
BASOPHILS NFR BLD: 0.8 % (ref 0–1.9)
BILIRUB SERPL-MCNC: 0.4 MG/DL (ref 0.1–1)
BUN SERPL-MCNC: 14 MG/DL (ref 8–23)
CALCIUM SERPL-MCNC: 9.8 MG/DL (ref 8.7–10.5)
CHLORIDE SERPL-SCNC: 109 MMOL/L (ref 95–110)
CO2 SERPL-SCNC: 26 MMOL/L (ref 23–29)
CREAT SERPL-MCNC: 1 MG/DL (ref 0.5–1.4)
CRP SERPL-MCNC: 1.1 MG/L (ref 0–8.2)
DIFFERENTIAL METHOD: NORMAL
EOSINOPHIL # BLD AUTO: 0.3 K/UL (ref 0–0.5)
EOSINOPHIL NFR BLD: 4.1 % (ref 0–8)
ERYTHROCYTE [DISTWIDTH] IN BLOOD BY AUTOMATED COUNT: 13.1 % (ref 11.5–14.5)
ERYTHROCYTE [SEDIMENTATION RATE] IN BLOOD BY PHOTOMETRIC METHOD: 8 MM/HR (ref 0–36)
EST. GFR  (NO RACE VARIABLE): 59.9 ML/MIN/1.73 M^2
GLUCOSE SERPL-MCNC: 90 MG/DL (ref 70–110)
HBV CORE AB SERPL QL IA: NORMAL
HBV SURFACE AG SERPL QL IA: NORMAL
HCT VFR BLD AUTO: 38.6 % (ref 37–48.5)
HCV AB SERPL QL IA: NORMAL
HGB BLD-MCNC: 12.5 G/DL (ref 12–16)
IMM GRANULOCYTES # BLD AUTO: 0.02 K/UL (ref 0–0.04)
IMM GRANULOCYTES NFR BLD AUTO: 0.2 % (ref 0–0.5)
IRON SERPL-MCNC: 76 UG/DL (ref 30–160)
LYMPHOCYTES # BLD AUTO: 1.6 K/UL (ref 1–4.8)
LYMPHOCYTES NFR BLD: 19.2 % (ref 18–48)
MCH RBC QN AUTO: 27.6 PG (ref 27–31)
MCHC RBC AUTO-ENTMCNC: 32.4 G/DL (ref 32–36)
MCV RBC AUTO: 85 FL (ref 82–98)
MONOCYTES # BLD AUTO: 0.9 K/UL (ref 0.3–1)
MONOCYTES NFR BLD: 10.6 % (ref 4–15)
NEUTROPHILS # BLD AUTO: 5.4 K/UL (ref 1.8–7.7)
NEUTROPHILS NFR BLD: 65.1 % (ref 38–73)
NRBC BLD-RTO: 0 /100 WBC
PLATELET # BLD AUTO: 278 K/UL (ref 150–450)
PMV BLD AUTO: 11.4 FL (ref 9.2–12.9)
POTASSIUM SERPL-SCNC: 4.1 MMOL/L (ref 3.5–5.1)
PROT SERPL-MCNC: 7.1 G/DL (ref 6–8.4)
RBC # BLD AUTO: 4.53 M/UL (ref 4–5.4)
SATURATED IRON: 20 % (ref 20–50)
SODIUM SERPL-SCNC: 140 MMOL/L (ref 136–145)
TOTAL IRON BINDING CAPACITY: 389 UG/DL (ref 250–450)
TRANSFERRIN SERPL-MCNC: 263 MG/DL (ref 200–375)
WBC # BLD AUTO: 8.34 K/UL (ref 3.9–12.7)

## 2023-10-31 PROCEDURE — 99999 PR PBB SHADOW E&M-EST. PATIENT-LVL III: ICD-10-PCS | Mod: PBBFAC,,, | Performed by: PHYSICIAN ASSISTANT

## 2023-10-31 PROCEDURE — 83540 ASSAY OF IRON: CPT | Performed by: PHYSICIAN ASSISTANT

## 2023-10-31 PROCEDURE — 80053 COMPREHEN METABOLIC PANEL: CPT | Performed by: PHYSICIAN ASSISTANT

## 2023-10-31 PROCEDURE — 99213 OFFICE O/P EST LOW 20 MIN: CPT | Mod: PBBFAC,PN | Performed by: PHYSICIAN ASSISTANT

## 2023-10-31 PROCEDURE — 99999 PR PBB SHADOW E&M-EST. PATIENT-LVL III: CPT | Mod: PBBFAC,,, | Performed by: PHYSICIAN ASSISTANT

## 2023-10-31 PROCEDURE — 86140 C-REACTIVE PROTEIN: CPT | Performed by: PHYSICIAN ASSISTANT

## 2023-10-31 PROCEDURE — 84466 ASSAY OF TRANSFERRIN: CPT | Performed by: PHYSICIAN ASSISTANT

## 2023-10-31 PROCEDURE — 99214 PR OFFICE/OUTPT VISIT, EST, LEVL IV, 30-39 MIN: ICD-10-PCS | Mod: S$PBB,,, | Performed by: PHYSICIAN ASSISTANT

## 2023-10-31 PROCEDURE — 99214 OFFICE O/P EST MOD 30 MIN: CPT | Mod: S$PBB,,, | Performed by: PHYSICIAN ASSISTANT

## 2023-10-31 PROCEDURE — 87340 HEPATITIS B SURFACE AG IA: CPT | Performed by: PHYSICIAN ASSISTANT

## 2023-10-31 PROCEDURE — 36415 COLL VENOUS BLD VENIPUNCTURE: CPT | Mod: PO | Performed by: PHYSICIAN ASSISTANT

## 2023-10-31 PROCEDURE — 86704 HEP B CORE ANTIBODY TOTAL: CPT | Performed by: PHYSICIAN ASSISTANT

## 2023-10-31 PROCEDURE — 85652 RBC SED RATE AUTOMATED: CPT | Performed by: PHYSICIAN ASSISTANT

## 2023-10-31 PROCEDURE — 85025 COMPLETE CBC W/AUTO DIFF WBC: CPT | Performed by: PHYSICIAN ASSISTANT

## 2023-10-31 PROCEDURE — 86803 HEPATITIS C AB TEST: CPT | Performed by: PHYSICIAN ASSISTANT

## 2023-10-31 RX ORDER — APREMILAST 30 MG/1
30 TABLET, FILM COATED ORAL 2 TIMES DAILY
Qty: 60 TABLET | Refills: 11 | Status: ACTIVE | OUTPATIENT
Start: 2023-10-31

## 2023-10-31 RX ORDER — AZELASTINE 1 MG/ML
2 SPRAY, METERED NASAL
COMMUNITY
Start: 2023-09-14

## 2023-10-31 RX ORDER — UBIQUINOL 100 MG
CAPSULE ORAL
COMMUNITY

## 2023-10-31 RX ORDER — BETAMETHASONE DIPROPIONATE 0.5 MG/G
CREAM TOPICAL DAILY
Qty: 50 G | Refills: 1 | Status: SHIPPED | OUTPATIENT
Start: 2023-10-31

## 2023-10-31 RX ORDER — CHOLECALCIFEROL (VITAMIN D3) 25 MCG
5000 TABLET ORAL
COMMUNITY

## 2023-10-31 ASSESSMENT — ROUTINE ASSESSMENT OF PATIENT INDEX DATA (RAPID3)
PSYCHOLOGICAL DISTRESS SCORE: 1.1
TOTAL RAPID3 SCORE: 1.06
PAIN SCORE: 1
FATIGUE SCORE: 0
MDHAQ FUNCTION SCORE: 0.2
PATIENT GLOBAL ASSESSMENT SCORE: 1.5

## 2023-10-31 NOTE — PROGRESS NOTES
Subjective:       Patient ID: Letha Enciso is a 73 y.o. female.    Chief Complaint: Disease Management    Mrs. Enciso is a 73 year old female who presents to clinic for follow up on psoriatic arthritis and psoriasis.  She complains of worsening psoriasis (itching, splitting) on her palms over the last 3-4 months. She was started on plaquenil at her last visit. She did not receive topical steroid ordered. She has been consistent with mobic 7.5 mg daily, which helps with her pain. Skyrizi was considered last year, but she decided not to proceed since she was doing well. She has minimal joint pain. She is still following gluten free diet.     She reports worsening fatigue.    We reviewed labs from 4/23.     Current tx:  1. mobic 7.5 mg  2. plaquenil      Review of Systems   Constitutional:  Positive for activity change. Negative for appetite change, chills, fatigue and fever.   Eyes:  Negative for visual disturbance.   Respiratory:  Negative for cough and shortness of breath.    Cardiovascular:  Negative for chest pain, palpitations and leg swelling.   Gastrointestinal:  Negative for abdominal pain, constipation, diarrhea, nausea and vomiting.   Musculoskeletal:  Positive for arthralgias.   Skin:  Positive for rash.   Neurological:  Negative for dizziness, weakness, light-headedness and headaches.         Objective:     Vitals:    10/31/23 1118   BP: (!) 140/89   Pulse: 60       Past Medical History:   Diagnosis Date    Anemia      History reviewed. No pertinent surgical history.       Physical Exam   Eyes: Right conjunctiva is not injected. Left conjunctiva is not injected.   Neck: No JVD present. No thyromegaly present.   Cardiovascular: Normal rate. Exam reveals no decreased pulses.   Pulmonary/Chest: Effort normal.   Musculoskeletal:      Right shoulder: Normal.      Left shoulder: Normal.      Right elbow: Normal.      Left elbow: Normal.      Right wrist: Normal.      Left wrist: Normal.      Right  knee: Normal.      Left knee: Normal.   Lymphadenopathy:     She has no cervical adenopathy.   Neurological: Gait normal.   Skin: Rash (psoriasis on the palms (r hand >L)) noted.   Psychiatric: Mood and affect normal.       Right Side Rheumatological Exam     Examination finds the shoulder, elbow, wrist, knee, 1st PIP, 1st MCP, 2nd PIP, 2nd MCP, 3rd PIP, 3rd MCP, 4th PIP, 4th MCP, 5th PIP and 5th MCP normal.    The patient is tender to palpation of the 2nd DIP    The patient has an enlarged 2nd DIP, 3rd DIP, 4th DIP and 5th DIP    Left Side Rheumatological Exam     Examination finds the shoulder, elbow, wrist, knee, 1st PIP, 1st MCP, 2nd PIP, 2nd MCP, 3rd PIP, 3rd MCP, 4th PIP, 4th MCP, 5th PIP and 5th MCP normal.    The patient has an enlarged 2nd DIP, 3rd DIP, 4th DIP and 5th DIP.        Labs:  Component      Latest Ref [facility] 4/11/2023   WBC      3.90 - 12.70 K/uL 8.69    RBC      4.00 - 5.40 M/uL 4.95    Hemoglobin      12.0 - 16.0 g/dL 12.8    Hematocrit      37.0 - 48.5 % 40.6    MCV      82 - 98 fL 82    MCH      27.0 - 31.0 pg 25.9 (L)    MCHC      32.0 - 36.0 g/dL 31.5 (L)    RDW      11.5 - 14.5 % 14.4    Platelet Count      150 - 450 K/uL 278    MPV      9.2 - 12.9 fL 11.9    Immature Granulocytes      0.0 - 0.5 % 0.2    Gran # (ANC)      1.8 - 7.7 K/uL 5.2    Immature Grans (Abs)      0.00 - 0.04 K/uL 0.02    Lymph #      1.0 - 4.8 K/uL 2.4    Mono #      0.3 - 1.0 K/uL 0.7    Eos #      0.0 - 0.5 K/uL 0.3    Baso #      0.00 - 0.20 K/uL 0.06    nRBC      0 /100 WBC 0    Gran %      38.0 - 73.0 % 59.9    Lymph %      18.0 - 48.0 % 27.3    Mono %      4.0 - 15.0 % 8.3    Eosinophil %      0.0 - 8.0 % 3.6    Basophil %      0.0 - 1.9 % 0.7    Differential Method Automated    Sodium      136 - 145 mmol/L 138    Potassium      3.5 - 5.1 mmol/L 3.9    Chloride      95 - 110 mmol/L 103    CO2      23 - 29 mmol/L 23    Glucose      70 - 110 mg/dL 87    BUN      8 - 23 mg/dL 13    Creatinine      0.5 - 1.4 mg/dL  1.0    Calcium      8.7 - 10.5 mg/dL 10.2    PROTEIN TOTAL      6.0 - 8.4 g/dL 7.7    Albumin      3.5 - 5.2 g/dL 4.3    BILIRUBIN TOTAL      0.1 - 1.0 mg/dL 0.5    ALP      55 - 135 U/L 108    AST      10 - 40 U/L 30    ALT      10 - 44 U/L 19    Anion Gap      8 - 16 mmol/L 12    eGFR      >60 mL/min/1.73 m^2 59.9 !    Iron      30 - 160 ug/dL 77    Transferrin      200 - 375 mg/dL 287    Transferrin      200 - 375 mg/dL 287    TIBC      250 - 450 ug/dL 425    Saturated Iron      20 - 50 % 18 (L)    Sed Rate      0 - 36 mm/Hr 29    CRP      0.0 - 8.2 mg/L 1.2    Thyroglobulin Ab Screen      0.0 - 3.9 IU/mL <4.0    Free T4      0.71 - 1.51 ng/dL 1.06    Thyroperoxidase Antibodies      <6.0 IU/mL 7.3 (H)    TSH      0.400 - 4.000 uIU/mL 0.807    T3, Free      2.3 - 4.2 pg/mL 2.3    Anti-Histone Antibody      0.0 - 0.9 Units 1.6 (H)    Vit D, 25-Hydroxy      30 - 96 ng/mL 56       Assessment:       1. PSA (psoriatic arthritis)    2. Psoriasis palmaris    3. Iron deficiency    4. High risk medication use              Plan:       PSA (psoriatic arthritis)  -     CBC Auto Differential; Future; Expected date: 10/31/2023  -     Comprehensive Metabolic Panel; Future; Expected date: 10/31/2023  -     C-Reactive Protein; Future; Expected date: 10/31/2023  -     Sedimentation rate; Future; Expected date: 10/31/2023  -     Quantiferon Gold TB; Future; Expected date: 10/31/2023  -     Hepatitis C Antibody; Future; Expected date: 10/31/2023  -     Hepatitis B Surface Antigen; Future; Expected date: 10/31/2023  -     Hepatitis B Core Antibody, Total; Future; Expected date: 10/31/2023  -     apremilast (OTEZLA) 30 mg Tab; Take 1 tablet (30 mg total) by mouth 2 (two) times a day.  Dispense: 60 tablet; Refill: 11  -     apremilast (OTEZLA STARTER) 10 mg (4)-20 mg (4)-30 mg (47) DsPk; Day 1: 10 mg in AM, Day 2: 10 mg BID (AM and PM), Day 3: 10 mg in AM and 20 mg in PM, Day 4: 20 mg BID (AM and PM), Day 5: 20 mg in AM and 30 mg in PM,  Day 6: start maintenance dosing of 30 mg BID (AM and PM).  Dispense: 55 tablet; Refill: 0    Psoriasis palmaris  -     augmented betamethasone dipropionate (DIPROLENE-AF) 0.05 % cream; Apply topically once daily.  Dispense: 50 g; Refill: 1    Iron deficiency  -     Iron and TIBC; Future; Expected date: 10/31/2023    High risk medication use          Assessment:  73 year old female with  Psoriatic arthritis, psoriasis, hx of MICHAEL positive, histone antibody positive  --hx of XANDER s/p infusion  --NCGS  --CKD, stable on mobic 7.5 mg    Plan:  1. Cont. mobic 7.5 mg prn.  2. Cont. Topical steroids prn. Diprolene 0.05% cream on the palms x 1 week.  3. Avoid gluten  4. D/C plaquenil due to worsening rash. Start otezla if possible. We considered this medication 3 year ago but it was too costly. If not able to obtain otezla then will consider skyrizi again.  5. Check labs today  6. Monitor BP at home and keep a log. Notify clinic if BP is persistently >140/90.      Follow up:  5 mo Dr. Cantu

## 2023-11-09 ENCOUNTER — TELEPHONE (OUTPATIENT)
Dept: RHEUMATOLOGY | Facility: CLINIC | Age: 73
End: 2023-11-09
Payer: MEDICARE

## 2023-11-09 NOTE — TELEPHONE ENCOUNTER
----- Message from Whitley Kaur PA-C sent at 11/1/2023  9:17 PM CDT -----  Iron level is normal. Blood counts are normal. Inflammatory markers are normal.

## 2024-04-11 ENCOUNTER — TELEPHONE (OUTPATIENT)
Dept: RHEUMATOLOGY | Facility: CLINIC | Age: 74
End: 2024-04-11
Payer: MEDICARE

## 2024-04-11 NOTE — TELEPHONE ENCOUNTER
Spoke with pt and was able to get tomorrow appt rescheduled to August pt aware of date and time of new appt

## 2024-04-11 NOTE — TELEPHONE ENCOUNTER
----- Message from Galindo Sanchez sent at 4/11/2024 10:15 AM CDT -----  Type:  Sooner Apoointment Request    Caller is requesting a sooner appointment. Caller will  accept being placed on the waitlist and is requesting a message be sent to doctor.  Name of Caller:pt  When is the first available appointment?none in epic  Symptoms:6 month f/u  Would the patient rather a call back or a response via [facility]? call  Best Call Back Number: [phone]  Additional Information:

## 2024-08-05 ENCOUNTER — OFFICE VISIT (OUTPATIENT)
Dept: RHEUMATOLOGY | Facility: CLINIC | Age: 74
End: 2024-08-05
Payer: MEDICARE

## 2024-08-05 VITALS
DIASTOLIC BLOOD PRESSURE: 79 MMHG | WEIGHT: 163.13 LBS | SYSTOLIC BLOOD PRESSURE: 160 MMHG | HEART RATE: 65 BPM | HEIGHT: 60 IN | BODY MASS INDEX: 32.02 KG/M2

## 2024-08-05 DIAGNOSIS — F33.9 EPISODE OF RECURRENT MAJOR DEPRESSIVE DISORDER, UNSPECIFIED DEPRESSION EPISODE SEVERITY: ICD-10-CM

## 2024-08-05 DIAGNOSIS — L40.3 PSORIASIS PALMARIS: ICD-10-CM

## 2024-08-05 DIAGNOSIS — L40.8 PSORIASIS WITH PUSTULES: ICD-10-CM

## 2024-08-05 DIAGNOSIS — L20.9 ATOPIC DERMATITIS, UNSPECIFIED TYPE: Primary | ICD-10-CM

## 2024-08-05 DIAGNOSIS — L13.0 DERMATITIS HERPETIFORMIS: ICD-10-CM

## 2024-08-05 DIAGNOSIS — K90.41 NCGS (NON-CELIAC GLUTEN SENSITIVITY): ICD-10-CM

## 2024-08-05 DIAGNOSIS — M54.31 SCIATIC PAIN, RIGHT: ICD-10-CM

## 2024-08-05 DIAGNOSIS — L40.50 PSA (PSORIATIC ARTHRITIS): ICD-10-CM

## 2024-08-05 DIAGNOSIS — G89.4 CHRONIC PAIN SYNDROME: ICD-10-CM

## 2024-08-05 DIAGNOSIS — D84.9 IMMUNE DEFICIENCY DISORDER: ICD-10-CM

## 2024-08-05 PROCEDURE — 99215 OFFICE O/P EST HI 40 MIN: CPT | Mod: S$PBB,,, | Performed by: INTERNAL MEDICINE

## 2024-08-05 PROCEDURE — 99213 OFFICE O/P EST LOW 20 MIN: CPT | Mod: PBBFAC,PO | Performed by: INTERNAL MEDICINE

## 2024-08-05 PROCEDURE — 99999 PR PBB SHADOW E&M-EST. PATIENT-LVL III: CPT | Mod: PBBFAC,,, | Performed by: INTERNAL MEDICINE

## 2024-08-05 RX ORDER — SERTRALINE HYDROCHLORIDE 50 MG/1
50 TABLET, FILM COATED ORAL NIGHTLY
Qty: 90 TABLET | Refills: 3 | Status: SHIPPED | OUTPATIENT
Start: 2024-08-05

## 2024-08-05 RX ORDER — RISANKIZUMAB-RZAA 150 MG/ML
150 INJECTION SUBCUTANEOUS
Qty: 1 ML | Refills: 4 | Status: ACTIVE | OUTPATIENT
Start: 2024-08-05 | End: 2024-08-15

## 2024-08-05 RX ORDER — TACROLIMUS 1 MG/G
OINTMENT TOPICAL 2 TIMES DAILY
Qty: 60 G | Refills: 0 | Status: SHIPPED | OUTPATIENT
Start: 2024-08-05

## 2024-08-05 RX ORDER — RISANKIZUMAB-RZAA 150 MG/ML
150 INJECTION SUBCUTANEOUS
Qty: 1 ML | Refills: 1 | Status: ACTIVE | OUTPATIENT
Start: 2024-08-05 | End: 2024-08-15

## 2024-08-05 RX ORDER — MELOXICAM 7.5 MG/1
7.5 TABLET ORAL DAILY
Qty: 90 TABLET | Refills: 3 | Status: SHIPPED | OUTPATIENT
Start: 2024-08-05

## 2024-08-05 ASSESSMENT — ROUTINE ASSESSMENT OF PATIENT INDEX DATA (RAPID3)
PAIN SCORE: 1
PSYCHOLOGICAL DISTRESS SCORE: 0
PATIENT GLOBAL ASSESSMENT SCORE: 0.5
MDHAQ FUNCTION SCORE: 0.3
TOTAL RAPID3 SCORE: 0.83

## 2024-08-05 NOTE — PROGRESS NOTES
Subjective:     Patient ID:  Letha Enciso    Chief Complaint:  Disease Management     History of Present Illness:  Pt is a 74 y.o. female who presents to clinic for follow up on psoriatic arthritis and psoriasis hands and ankles B. She complains of worsening psoriasis (itching, splitting) on her palms over the last 3-4 months. She was started on plaquenil at her last visit. She did not receive topical steroid ordered. She has been consistent with mobic 7.5 mg daily, which helps with her pain. Skyrizi was considered last year, but she decided not to proceed since she was doing well. She has minimal joint pain. She is still following gluten free diet.       Rheumatologic History:   - Diagnosis/es:  - Positive serologies:  - Infectious screening labs:  - Previous Treatments:  - Current Treatments:     Interval History:   Hospitalization since last office visit: No    Patient Active Problem List    Diagnosis Date Noted    Immunocompromised state due to drug therapy 04/11/2023    PSA (psoriatic arthritis) 04/11/2023    Hypothyroidism 01/20/2020     No past surgical history on file.  Social History     Tobacco Use    Smoking status: Never    Smokeless tobacco: Never   Substance Use Topics    Alcohol use: Not Currently    Drug use: Never     No family history on file.  Review of patient's allergies indicates:   Allergen Reactions    Ciprofloxacin Other (See Comments)     Tendonitis and cartilage damage    Methotrexate analogues Other (See Comments)     Sick and developed Covid 19    Azulfidine [sulfasalazine] Dermatitis    Neomycin-bacitracnzn-polymyxnb Rash    Penicillins Rash       Review of Systems   Review of Systems     Current Medications:  Current Outpatient Medications   Medication Instructions    apremilast (OTEZLA STARTER) 10 mg (4)-20 mg (4)-30 mg (47) DsPk Day 1: 10 mg in AM, Day 2: 10 mg BID (AM and PM), Day 3: 10 mg in AM and 20 mg in PM, Day 4: 20 mg BID (AM and PM), Day 5: 20 mg in AM and 30 mg in PM,  Day 6: start maintenance dosing of 30 mg BID (AM and PM).    augmented betamethasone dipropionate (DIPROLENE-AF) 0.05 % cream Topical (Top), Daily    betamethasone valerate 0.1% (VALISONE) 0.1 % Oint Topical (Top), 2 times daily    clobetasol 0.05% (TEMOVATE) 0.05 % Oint Topical (Top), 2 times daily    famotidine (PEPCID) 40 mg, Oral, Daily    glucosamine HCl 750 mg Tab Oral    LIDOcaine-prilocaine (EMLA) cream Topical (Top), As needed (PRN)    meloxicam (MOBIC) 7.5 MG tablet TAKE 1 TABLET(7.5 MG) BY MOUTH EVERY DAY    OTEZLA 30 mg, Oral, 2 times daily    sertraline (ZOLOFT) 50 MG tablet TAKE 1 TABLET(50 MG) BY MOUTH EVERY EVENING    SKYRIZI 150 mg, Subcutaneous, Every 28 days    SKYRIZI 150 mg, Subcutaneous, Every 12 weeks    SYNTHROID 75 mcg, Oral, Before breakfast    tacrolimus (PROTOPIC) 0.1 % ointment Topical (Top), 2 times daily    vitamin D (VITAMIN D3) 5,000 Units         Objective:     Vitals:    08/05/24 1655   BP: (!) 160/79   Pulse: 65   Weight: 74 kg (163 lb 2.3 oz)   Height: 5' (1.524 m)   PainSc: 0-No pain      Body mass index is 31.86 kg/m².     Physical Examinations:  Physical Exam   Constitutional: She is oriented to person, place, and time.   HENT:   Head: Normocephalic and atraumatic.   Mouth/Throat: Oropharynx is clear and moist.   Eyes: Pupils are equal, round, and reactive to light.   Neck: No thyromegaly present.   Cardiovascular: Normal rate, regular rhythm and normal heart sounds. Exam reveals no gallop and no friction rub.   No murmur heard.  Pulmonary/Chest: Breath sounds normal. She has no wheezes. She has no rales. She exhibits no tenderness.   Abdominal: There is no abdominal tenderness. There is no rebound and no guarding.   Musculoskeletal:         General: Tenderness and deformity present.      Right shoulder: Tenderness present.      Left shoulder: Tenderness present.      Right elbow: Normal.      Left elbow: Normal.      Right wrist: Swelling and tenderness present.      Left  wrist: Swelling and tenderness present.      Cervical back: Neck supple.      Right knee: No effusion. Tenderness present.      Left knee: No effusion. Tenderness present.      Left ankle: Swelling present.   Lymphadenopathy:     She has no cervical adenopathy.   Neurological: She is alert and oriented to person, place, and time. Gait normal.   Skin: No rash noted. No erythema. No pallor.   Psychiatric: Mood and affect normal.   Nursing note and vitals reviewed.      Right Side Rheumatological Exam     Examination finds the elbow normal.    The patient is tender to palpation of the shoulder, wrist, knee, 1st PIP, 1st MCP, 2nd PIP, 2nd MCP, 3rd PIP, 3rd MCP, 4th PIP, 4th MCP, 5th PIP and 5th MCP    She has swelling of the wrist, 1st PIP, 1st MCP, 2nd PIP, 2nd MCP, 3rd PIP, 3rd MCP, 4th PIP, 4th MCP, 5th PIP and 5th MCP    The patient has an enlarged wrist    Shoulder Exam   Tenderness Location: no tenderness    Range of Motion   Active abduction:  abnormal   Adduction: abnormal  Sensation: normal    Knee Exam   Tenderness Location: lateral joint line  Patellofemoral Crepitus: positive  Effusion: negative  Sensation: normal    Hip Exam   Tenderness Location: posterior  Sensation: normal    Elbow/Wrist Exam   Tenderness Location: no tenderness  Sensation: normal    Muscle Strength (0-5 scale):  Neck Flexion:  2  Neck Extension: 2  : 2/5     Left Side Rheumatological Exam     Examination finds the elbow normal.    The patient is tender to palpation of the shoulder, wrist, knee, 1st PIP, 1st MCP, 2nd PIP, 2nd MCP, 3rd PIP, 3rd MCP, 4th PIP, 4th MCP, 5th PIP, 5th MCP and temporomandibular.    She has swelling of the wrist, 1st PIP, 1st MCP, 2nd PIP, 2nd MCP, 3rd PIP, 3rd MCP, 4th PIP, 4th MCP, 5th PIP, 5th MCP, 1st CMC, 2nd DIP, 3rd DIP, 4th DIP, 5th DIP, knee, 1st MTP, 2nd MTP, 3rd MTP, 4th MTP, 1st toe IP, 2nd toe IP, 3rd toe IP, 4th toe IP and 5th toe IP    The patient has an enlarged wrist, 1st CMC, 2nd DIP, 3rd  "DIP, 4th DIP, 5th DIP, 1st toe IP, 2nd toe IP, 3rd toe IP, 4th toe IP and 5th toe IP.    Shoulder Exam   Tenderness Location: acromioclavicular joint    Range of Motion   Active abduction:  abnormal   Sensation: normal    Knee Exam     Patellofemoral Crepitus: positive  Effusion: negative  Sensation: normal    Hip Exam   Tenderness Location: posterior  Sensation: normal    Elbow/Wrist Exam   Sensation: normal    Muscle Strength (0-5 scale):  Neck Flexion:  2  Neck Extension: 2  :  1/5       Back/Neck Exam   General Inspection   Gait: normal          Disease Assessment Scores:  Patient's Global Assessment of arthritis (0-10): 3  Physician's Global Assessment of arthritis (0-10): 2  Number of Tender Joints (0-28): 2  Number of Swollen Joints (0-28): 2         No data to display                Monitoring Lab Results:  Lab Results   Component Value Date    WBC 8.34 10/31/2023    RBC 4.53 10/31/2023    HGB 12.5 10/31/2023    HCT 38.6 10/31/2023    MCV 85 10/31/2023    MCH 27.6 10/31/2023    MCHC 32.4 10/31/2023    RDW 13.1 10/31/2023     10/31/2023        Lab Results   Component Value Date     10/31/2023    K 4.1 10/31/2023     10/31/2023    CO2 26 10/31/2023    GLU 90 10/31/2023    BUN 14 10/31/2023    CREATININE 1.0 10/31/2023    CALCIUM 9.8 10/31/2023    PROT 7.1 10/31/2023    ALBUMIN 3.9 10/31/2023    BILITOT 0.4 10/31/2023    ALKPHOS 106 10/31/2023    AST 30 10/31/2023    ALT 19 10/31/2023    ANIONGAP 5 (L) 10/31/2023    EGFRNORACEVR 59.9 (A) 10/31/2023       Lab Results   Component Value Date    SEDRATE 8 10/31/2023    CRP 1.1 10/31/2023        Lab Results   Component Value Date    DXHSARUV40SZ 56 04/11/2023        No results found for: "CHOL", "HDL", "LDLCALC", "TRIG"    Lab Results   Component Value Date    RF <10.0 01/20/2020    CCPANTIBODIE <0.5 01/20/2020     Lab Results   Component Value Date    ANASCREEN Negative <1:160 01/20/2020    DSDNA Negative 1:10 01/20/2020     Lab Results " "  Component Value Date    HLABB27 Negative 01/20/2020       Infectious Disease Screening:  Lab Results   Component Value Date    HEPBSAG Non-reactive 10/31/2023    HEPBCAB Non-reactive 10/31/2023    HEPBSAB Negative 03/22/2021    HEPBIGM Negative 03/22/2021     Lab Results   Component Value Date    HEPCAB Non-reactive 10/31/2023     Lab Results   Component Value Date    TBGOLDPLUS Negative 10/31/2023     No results found for: "QUANTIFERON", "SVCMT", "QUANTAGVALUE", "QUANTNILVALU", "QUANTMITOGEN", "QFTTBAG", "QINT"     Imaging:  DEXA, Xrays, MRIs, CTs, etc    Old & Outside Medical Records:  Reviewed old and all outside medical records available in Care Everywhere     Assessment:     Pt is a 74 y.o. female with psoriatic arthritis. The patient has not achieved clinical remission.    Plan:      Encounter Diagnoses   Name Primary?    Atopic dermatitis, unspecified type Yes    PSA (psoriatic arthritis)     Psoriasis palmaris     Chronic pain syndrome     Psoriasis with pustules      Letha was seen today for disease management.    Diagnoses and all orders for this visit:    Atopic dermatitis, unspecified type  -     tacrolimus (PROTOPIC) 0.1 % ointment; Apply topically 2 (two) times daily.  -     Sedimentation rate; Future  -     C-Reactive Protein; Future  -     Comprehensive Metabolic Panel; Future  -     CBC Auto Differential; Future    PSA (psoriatic arthritis)  -     risankizumab-rzaa (SKYRIZI) 150 mg/mL PnIj; Inject 150 mg into the skin every 28 days.  -     risankizumab-rzaa (SKYRIZI) 150 mg/mL PnIj; Inject 150 mg into the skin every 12 weeks.  -     Sedimentation rate; Future  -     C-Reactive Protein; Future  -     Comprehensive Metabolic Panel; Future  -     CBC Auto Differential; Future    Psoriasis palmaris  -     risankizumab-rzaa (SKYRIZI) 150 mg/mL PnIj; Inject 150 mg into the skin every 28 days.  -     risankizumab-rzaa (SKYRIZI) 150 mg/mL PnIj; Inject 150 mg into the skin every 12 weeks.  -     " Sedimentation rate; Future  -     C-Reactive Protein; Future  -     Comprehensive Metabolic Panel; Future  -     CBC Auto Differential; Future    Chronic pain syndrome  -     Sedimentation rate; Future  -     C-Reactive Protein; Future  -     Comprehensive Metabolic Panel; Future  -     CBC Auto Differential; Future    Psoriasis with pustules  -     Sedimentation rate; Future  -     C-Reactive Protein; Future  -     Comprehensive Metabolic Panel; Future  -     CBC Auto Differential; Future        1. We will retry otezla from tab s at home if causes pain will try skyrizi  2. Add protopics ointment to  help ezcema if no better will try topical biologic  3. Labs prior     Follow-up 6 months    More than 50% of the  40 minute encounter was spent face to face counseling the patient regarding current status and future plan of care as well as side effects  of the medications. All questions were answered to patient's satisfaction also includes  non-face to face time preparing to see the patient (eg, review of tests), Obtaining and/or reviewing separately obtained history, Documenting clinical information in the electronic or other health record, Independently interpreting results

## 2024-08-15 ENCOUNTER — TELEPHONE (OUTPATIENT)
Dept: RHEUMATOLOGY | Facility: CLINIC | Age: 74
End: 2024-08-15
Payer: MEDICARE

## 2024-08-15 NOTE — TELEPHONE ENCOUNTER
----- Message from Ramon Stover PharmD sent at 8/14/2024  4:30 PM CDT -----  Regarding: Skyrizi  Patient stated that the Otezla is working well for her. Did you still want to switch over to Skyrizi?

## 2024-08-15 NOTE — TELEPHONE ENCOUNTER
If patient feels that Otezla is working well for her and would like to hold off on Skyrizi, ok to hold off at this time

## 2025-02-20 ENCOUNTER — OFFICE VISIT (OUTPATIENT)
Dept: RHEUMATOLOGY | Facility: CLINIC | Age: 75
End: 2025-02-20
Payer: MEDICARE

## 2025-02-20 VITALS
SYSTOLIC BLOOD PRESSURE: 164 MMHG | HEART RATE: 85 BPM | DIASTOLIC BLOOD PRESSURE: 83 MMHG | HEIGHT: 60 IN | WEIGHT: 161.63 LBS | BODY MASS INDEX: 31.73 KG/M2

## 2025-02-20 DIAGNOSIS — Z79.899 HIGH RISK MEDICATION USE: ICD-10-CM

## 2025-02-20 DIAGNOSIS — L40.50 PSA (PSORIATIC ARTHRITIS): ICD-10-CM

## 2025-02-20 DIAGNOSIS — L40.9 PSORIASIS: Primary | ICD-10-CM

## 2025-02-20 DIAGNOSIS — D84.821 IMMUNOCOMPROMISED STATE DUE TO DRUG THERAPY: ICD-10-CM

## 2025-02-20 DIAGNOSIS — E03.9 HYPOTHYROIDISM, UNSPECIFIED TYPE: ICD-10-CM

## 2025-02-20 DIAGNOSIS — Z79.899 IMMUNOCOMPROMISED STATE DUE TO DRUG THERAPY: ICD-10-CM

## 2025-02-20 PROCEDURE — 99215 OFFICE O/P EST HI 40 MIN: CPT | Mod: 25,S$PBB,, | Performed by: INTERNAL MEDICINE

## 2025-02-20 PROCEDURE — 96372 THER/PROPH/DIAG INJ SC/IM: CPT | Mod: PBBFAC,PO

## 2025-02-20 PROCEDURE — 99999PBSHW PR PBB SHADOW TECHNICAL ONLY FILED TO HB: Mod: JZ,PBBFAC,,

## 2025-02-20 PROCEDURE — 99999 PR PBB SHADOW E&M-EST. PATIENT-LVL III: CPT | Mod: PBBFAC,,, | Performed by: INTERNAL MEDICINE

## 2025-02-20 PROCEDURE — 99213 OFFICE O/P EST LOW 20 MIN: CPT | Mod: PBBFAC,PO | Performed by: INTERNAL MEDICINE

## 2025-02-20 RX ORDER — AMLODIPINE BESYLATE 2.5 MG/1
2.5 TABLET ORAL DAILY
COMMUNITY

## 2025-02-20 RX ORDER — PREDNISONE 2.5 MG/1
2.5 TABLET ORAL DAILY PRN
Qty: 90 TABLET | Refills: 3 | Status: SHIPPED | OUTPATIENT
Start: 2025-02-20

## 2025-02-20 RX ORDER — APREMILAST 30 MG/1
30 TABLET, FILM COATED ORAL 2 TIMES DAILY
Qty: 60 TABLET | Refills: 11 | Status: ACTIVE | OUTPATIENT
Start: 2025-02-20

## 2025-02-20 RX ORDER — TRAMADOL HYDROCHLORIDE 50 MG/1
50 TABLET ORAL EVERY 8 HOURS PRN
Qty: 90 TABLET | Refills: 3 | Status: SHIPPED | OUTPATIENT
Start: 2025-02-20 | End: 2025-08-19

## 2025-02-20 RX ORDER — CLOPIDOGREL BISULFATE 75 MG/1
75 TABLET ORAL DAILY
COMMUNITY

## 2025-02-20 RX ORDER — ATORVASTATIN CALCIUM 10 MG/1
10 TABLET, FILM COATED ORAL DAILY
COMMUNITY

## 2025-02-20 RX ORDER — METHYLPREDNISOLONE ACETATE 80 MG/ML
80 INJECTION, SUSPENSION INTRA-ARTICULAR; INTRALESIONAL; INTRAMUSCULAR; SOFT TISSUE
Status: COMPLETED | OUTPATIENT
Start: 2025-02-20 | End: 2025-02-20

## 2025-02-20 RX ORDER — APREMILAST 30 MG/1
30 TABLET, FILM COATED ORAL 2 TIMES DAILY
Qty: 60 TABLET | Refills: 11 | Status: SHIPPED | OUTPATIENT
Start: 2025-02-20 | End: 2025-02-20 | Stop reason: SDUPTHER

## 2025-02-20 RX ADMIN — METHYLPREDNISOLONE ACETATE 80 MG: 80 INJECTION, SUSPENSION INTRA-ARTICULAR; INTRALESIONAL; INTRAMUSCULAR; SOFT TISSUE at 12:02

## 2025-02-20 ASSESSMENT — ROUTINE ASSESSMENT OF PATIENT INDEX DATA (RAPID3)
FATIGUE SCORE: 0
PATIENT GLOBAL ASSESSMENT SCORE: 3
PSYCHOLOGICAL DISTRESS SCORE: 0
TOTAL RAPID3 SCORE: 3.11
MDHAQ FUNCTION SCORE: 0.4
PAIN SCORE: 5

## 2025-02-20 NOTE — PROGRESS NOTES
Hand hygiene performed. Dr. Cantu ordered injection and patient consented. Patient was given Depo-Medrol 80 mg IM injection in her right outer gluteal muscle. Patient tolerated injection and was advised to wait 15 minutes for observation. Patient was told if she should feel abnormal please report to the nearest ER or UC for treatment. Patient stated she understood.

## 2025-02-20 NOTE — PROGRESS NOTES
Subjective:     Patient ID:  Letha Enciso    Chief Complaint:  Disease Management     History of Present Illness:  Pt is a 75 y.o. female  dx w/ psoriatic arthritis and psoriasis hands and ankles B.she had a CVA in jan 2025. She complains of worsening psoriasis (itching, splitting) on her palms over the last 3-4 months.  She has some verbal deficiet and writing changes, she dc her mobic due to cva. She has minimal joint pain. She is still following gluten free diet.            Rheumatologic History:   - Diagnosis/es:  - Positive serologies:  - Infectious screening labs:  - Previous Treatments:  - Current Treatments: plaquenil and otezla    Interval History:   Hospitalization since last office visit: No    Patient Active Problem List    Diagnosis Date Noted    Immunocompromised state due to drug therapy 04/11/2023    PSA (psoriatic arthritis) 04/11/2023    Hypothyroidism 01/20/2020     No past surgical history on file.  Social History[1]  No family history on file.  Review of patient's allergies indicates:   Allergen Reactions    Ciprofloxacin Other (See Comments)     Tendonitis and cartilage damage    Methotrexate analogues Other (See Comments)     Sick and developed Covid 19    Azulfidine [sulfasalazine] Dermatitis    Neomycin-bacitracnzn-polymyxnb Rash    Penicillins Rash       Review of Systems   Review of Systems   Constitutional:  Positive for fatigue. Negative for activity change, appetite change, chills, diaphoresis, fever and unexpected weight change.   HENT:  Negative for congestion, dental problem, ear discharge, ear pain, facial swelling, mouth sores, nosebleeds, postnasal drip, rhinorrhea, sinus pressure, sneezing, sore throat, tinnitus, trouble swallowing and voice change.    Eyes:  Negative for photophobia, pain, discharge, redness and itching.   Respiratory:  Positive for cough. Negative for apnea, chest tightness, shortness of breath and wheezing.    Cardiovascular:  Positive for leg swelling.  Negative for chest pain and palpitations.   Gastrointestinal:  Positive for abdominal pain. Negative for abdominal distention, constipation, diarrhea, nausea and vomiting.   Endocrine: Negative for cold intolerance, heat intolerance, polydipsia and polyuria.   Genitourinary:  Negative for decreased urine volume, difficulty urinating, dysuria, flank pain, frequency, hematuria and urgency.   Musculoskeletal:  Positive for arthralgias, back pain, gait problem, joint swelling, myalgias, neck pain and neck stiffness.   Skin:  Negative for pallor, rash and wound.   Allergic/Immunologic: Negative for immunocompromised state.   Neurological:  Negative for dizziness, tremors, numbness and headaches.   Hematological:  Negative for adenopathy. Does not bruise/bleed easily.   Psychiatric/Behavioral:  Negative for sleep disturbance. The patient is not nervous/anxious.         Current Medications:  Current Outpatient Medications   Medication Instructions    amLODIPine (NORVASC) 2.5 mg, Oral, Daily    atorvastatin (LIPITOR) 10 mg, Oral, Daily    augmented betamethasone dipropionate (DIPROLENE-AF) 0.05 % cream Topical (Top), Daily    betamethasone valerate 0.1% (VALISONE) 0.1 % Oint Topical (Top), 2 times daily    clobetasol 0.05% (TEMOVATE) 0.05 % Oint Topical (Top), 2 times daily    clopidogreL (PLAVIX) 75 mg, Oral, Daily    famotidine (PEPCID) 40 mg, Oral, Daily    glucosamine HCl 750 mg Tab Take by mouth.    LIDOcaine-prilocaine (EMLA) cream Topical (Top), As needed (PRN)    OTEZLA 30 mg, Oral, 2 times daily    predniSONE (DELTASONE) 2.5 mg, Oral, Daily PRN    sertraline (ZOLOFT) 50 mg, Oral, Nightly    SYNTHROID 75 mcg, Oral, Before breakfast    tacrolimus (PROTOPIC) 0.1 % ointment Topical (Top), 2 times daily    traMADoL (ULTRAM) 50 mg, Oral, Every 8 hours PRN    vitamin D (VITAMIN D3) 5,000 Units         Objective:     Vitals:    02/20/25 1051   BP: (!) 164/83   Pulse: 85   Weight: 73.3 kg (161 lb 9.6 oz)   Height: 5' (1.524  m)   PainSc:   6      Body mass index is 31.56 kg/m².     Physical Examinations:  Physical Exam   Constitutional: She is oriented to person, place, and time. No distress.   HENT:   Head: Normocephalic and atraumatic.   Eyes: Pupils are equal, round, and reactive to light. Right eye exhibits no discharge. Left eye exhibits no discharge.   Neck: No thyromegaly present.   Cardiovascular: Normal rate, regular rhythm and normal heart sounds. Exam reveals no gallop and no friction rub.   No murmur heard.  Pulmonary/Chest: Breath sounds normal. She has no wheezes. She has no rales. She exhibits no tenderness.   Abdominal: There is no abdominal tenderness. There is no rebound and no guarding.   Musculoskeletal:         General: Tenderness and deformity present.      Right shoulder: Tenderness present.      Left shoulder: Tenderness present.      Right elbow: Normal.      Left elbow: Tenderness present.      Right wrist: Tenderness present.      Left wrist: Tenderness present.      Cervical back: Neck supple.      Right knee: Effusion present. Tenderness present.      Left knee: Effusion present. Tenderness present.   Lymphadenopathy:     She has no cervical adenopathy.   Neurological: She is alert and oriented to person, place, and time. Gait normal.   Skin: Skin is dry. No rash noted. No erythema. There is pallor.   Psychiatric: Mood and affect normal.   Vitals reviewed.      Right Side Rheumatological Exam     Examination finds the elbow normal.    The patient is tender to palpation of the shoulder, wrist, knee, 1st PIP, 1st MCP, 2nd PIP, 2nd MCP, 3rd PIP, 3rd MCP, 4th PIP, 4th MCP, 5th PIP and 5th MCP    She has swelling of the 1st PIP, 1st MCP, 2nd PIP, 2nd MCP, 3rd PIP, 3rd MCP, 4th PIP, 4th MCP, 5th PIP and 5th MCP    Shoulder Exam   Tenderness Location: no tenderness    Range of Motion   Active abduction:  abnormal   Adduction: abnormal  Sensation: normal    Knee Exam   Patellofemoral Crepitus: positive  Effusion:  positive  Sensation: normal    Hip Exam   Tenderness Location: posterior  Sensation: normal    Elbow/Wrist Exam   Tenderness Location: no tenderness  Sensation: normal    Left Side Rheumatological Exam     The patient is tender to palpation of the shoulder, elbow, wrist, knee, 1st PIP, 1st MCP, 2nd PIP, 2nd MCP, 3rd PIP, 3rd MCP, 4th PIP, 4th MCP, 5th PIP and 5th MCP.    She has swelling of the 1st PIP, 1st MCP, 2nd PIP, 2nd MCP, 3rd PIP, 3rd MCP, 4th PIP, 4th MCP, 5th PIP and 5th MCP    Shoulder Exam   Tenderness Location: no tenderness    Range of Motion   Active abduction:  abnormal   Sensation: normal    Knee Exam     Patellofemoral Crepitus: positive  Effusion: positive  Sensation: normal    Hip Exam   Tenderness Location: posterior  Sensation: normal    Elbow/Wrist Exam   Sensation: normal      Back/Neck Exam   General Inspection   Gait: normal            Disease Assessment Scores:  Patient's Global Assessment of arthritis (0-10): 1  Physician's Global Assessment of arthritis (0-10): 1  Number of Tender Joints (0-28): 1  Number of Swollen Joints (0-28): 1         No data to display                Monitoring Lab Results:  Lab Results   Component Value Date    WBC 8.34 10/31/2023    RBC 4.53 10/31/2023    HGB 12.5 10/31/2023    HCT 38.6 10/31/2023    MCV 85 10/31/2023    MCH 27.6 10/31/2023    MCHC 32.4 10/31/2023    RDW 13.1 10/31/2023     10/31/2023        Lab Results   Component Value Date     01/13/2025    K 4.2 01/13/2025     01/13/2025    CO2 23 01/13/2025    GLU 90 10/31/2023    BUN 15 01/13/2025    CREATININE 1.13 (H) 01/13/2025    CALCIUM 9 01/13/2025    PROT 7.1 10/31/2023    ALBUMIN 3.9 10/31/2023    BILITOT 0.4 10/31/2023    ALKPHOS 106 10/31/2023    AST 30 10/31/2023    ALT 19 10/31/2023    ANIONGAP 9 01/13/2025    EGFRNORACEVR 59.9 (A) 10/31/2023       Lab Results   Component Value Date    SEDRATE 8 10/31/2023    CRP 1.1 10/31/2023        Lab Results   Component Value Date     "CEHGFINZ12VF 56 04/11/2023        Lab Results   Component Value Date    CHOL 213 (H) 01/09/2025    HDL 48 (L) 01/09/2025    LDLCALC 127 01/09/2025    TRIG 188 (H) 01/09/2025       Lab Results   Component Value Date    RF <10.0 01/20/2020    CCPANTIBODIE <0.5 01/20/2020     Lab Results   Component Value Date    ANASCREEN Negative <1:160 01/20/2020    DSDNA Negative 1:10 01/20/2020     Lab Results   Component Value Date    HLABB27 Negative 01/20/2020       Infectious Disease Screening:  Lab Results   Component Value Date    HEPBSAG Non-reactive 10/31/2023    HEPBCAB Non-reactive 10/31/2023    HEPBSAB Negative 03/22/2021    HEPBIGM Negative 03/22/2021     Lab Results   Component Value Date    HEPCAB Non-reactive 10/31/2023     Lab Results   Component Value Date    TBGOLDPLUS Negative 10/31/2023     No results found for: "QUANTIFERON", "SVCMT", "QUANTAGVALUE", "QUANTNILVALU", "QUANTMITOGEN", "QFTTBAG", "QINT"     Imaging: DEXA, Xrays, MRIs, CTs, etc    MRI Brain Without Contrast  Order: [id number]  Impression    Scattered small acute infarcts posterior left frontal and adjacent parietal lobe cortex.  No intracranial hemorrhage  Atrophy and chronic microvascular changes        Signer Name: Eliecer Alonso MD  Signed: 1/9/2025 11:54 PM CST  ()  Narrative    EXAM: MRI BRAIN WO CONTRAST    DATE OF EXAM:  1/9/2025 10:00 PM CST    INDICATION FOR STUDY:  right arm numbness,    COMPARISON:  No priors.    TECHNIQUE:  Multiplanar multisequence MRI imaging was performed of the brain without IV contrast      FINDINGS:    The diffusion sequence demonstrates several small acute infarcts in posterior left frontal and adjacent parietal lobe cortex    There is generalized cerebral cortical atrophy and there are scattered T2 hyperintensities within the periventricular white matter bilaterally consistent chronic microvascular changes.    There is no evidence of a recent intracranial hemorrhage.    No mass, mass effect or abnormal " extra-axial fluid collections are identified.    The ventricular system is within normal limits of size and symmetry.    The major vascular flow voids are identified.    There is a mild opacification of the mastoids bilaterally and mild mucosal thickening involves the ethmoid sinuses.  Images on Order [id number]    Image Retrieve    The full-size image has not yet been retrieved from an outside organization. To retrieve, click the link below.  Scan on 1/9/2025: MRI Brain without Contrast  Old & Outside Medical Records:  Reviewed old and all outside medical records available in Care Everywhere     Assessment:     Encounter Diagnoses   Name Primary?    Psoriasis Yes    PSA (psoriatic arthritis)     High risk medication use     Immunocompromised state due to drug therapy     Hypothyroidism, unspecified type         Plan:      Encounter Diagnoses   Name Primary?    PSA (psoriatic arthritis)     Psoriasis Yes    High risk medication use     Immunocompromised state due to drug therapy     Hypothyroidism, unspecified type      Letha was seen today for disease management.    Diagnoses and all orders for this visit:    Psoriasis  -     methylPREDNISolone acetate injection 80 mg  -     predniSONE (DELTASONE) 2.5 MG tablet; Take 1 tablet (2.5 mg total) by mouth daily as needed (in am as needed for flare).  -     traMADoL (ULTRAM) 50 mg tablet; Take 1 tablet (50 mg total) by mouth every 8 (eight) hours as needed for Pain.  -     Sedimentation rate; Future  -     C-Reactive Protein; Future  -     Comprehensive Metabolic Panel; Future  -     CBC Auto Differential; Future    PSA (psoriatic arthritis)  -     Discontinue: apremilast (OTEZLA) 30 mg Tab; Take 1 tablet (30 mg total) by mouth 2 (two) times a day.  -     methylPREDNISolone acetate injection 80 mg  -     predniSONE (DELTASONE) 2.5 MG tablet; Take 1 tablet (2.5 mg total) by mouth daily as needed (in am as needed for flare).  -     traMADoL (ULTRAM) 50 mg tablet; Take 1  tablet (50 mg total) by mouth every 8 (eight) hours as needed for Pain.  -     apremilast (OTEZLA) 30 mg Tab; Take 1 tablet (30 mg total) by mouth 2 (two) times a day.  -     Sedimentation rate; Future  -     C-Reactive Protein; Future  -     Comprehensive Metabolic Panel; Future  -     CBC Auto Differential; Future    High risk medication use  -     methylPREDNISolone acetate injection 80 mg  -     predniSONE (DELTASONE) 2.5 MG tablet; Take 1 tablet (2.5 mg total) by mouth daily as needed (in am as needed for flare).  -     traMADoL (ULTRAM) 50 mg tablet; Take 1 tablet (50 mg total) by mouth every 8 (eight) hours as needed for Pain.  -     Sedimentation rate; Future  -     C-Reactive Protein; Future  -     Comprehensive Metabolic Panel; Future  -     CBC Auto Differential; Future    Immunocompromised state due to drug therapy  -     methylPREDNISolone acetate injection 80 mg  -     predniSONE (DELTASONE) 2.5 MG tablet; Take 1 tablet (2.5 mg total) by mouth daily as needed (in am as needed for flare).  -     traMADoL (ULTRAM) 50 mg tablet; Take 1 tablet (50 mg total) by mouth every 8 (eight) hours as needed for Pain.  -     Sedimentation rate; Future  -     C-Reactive Protein; Future  -     Comprehensive Metabolic Panel; Future  -     CBC Auto Differential; Future    Hypothyroidism, unspecified type  -     methylPREDNISolone acetate injection 80 mg  -     predniSONE (DELTASONE) 2.5 MG tablet; Take 1 tablet (2.5 mg total) by mouth daily as needed (in am as needed for flare).  -     traMADoL (ULTRAM) 50 mg tablet; Take 1 tablet (50 mg total) by mouth every 8 (eight) hours as needed for Pain.  -     Sedimentation rate; Future  -     C-Reactive Protein; Future  -     Comprehensive Metabolic Panel; Future  -     CBC Auto Differential; Future          1. Nurse injection  2. labs  3. Labs ordered  4.f/u 6 months     More than 50% of the  40 minute encounter was spent face to face counseling the patient regarding current  status and future plan of care as well as side effects  of the medications. All questions were answered to patient's satisfaction also includes  non-face to face time preparing to see the patient (eg, review of tests), Obtaining and/or reviewing separately obtained history, Documenting clinical information in the electronic or other health record, Independently interpreting results            [1]   Social History  Tobacco Use    Smoking status: Never    Smokeless tobacco: Never   Substance Use Topics    Alcohol use: Not Currently    Drug use: Never